# Patient Record
Sex: FEMALE | Race: WHITE | Employment: FULL TIME | URBAN - METROPOLITAN AREA
[De-identification: names, ages, dates, MRNs, and addresses within clinical notes are randomized per-mention and may not be internally consistent; named-entity substitution may affect disease eponyms.]

---

## 2017-10-22 ENCOUNTER — HOSPITAL ENCOUNTER (EMERGENCY)
Age: 52
Discharge: HOME | End: 2017-10-22
Attending: PHYSICIAN ASSISTANT

## 2017-10-22 DIAGNOSIS — T14.8XXA BITE BY ANIMAL: Primary | ICD-10-CM

## 2017-10-22 RX ORDER — AMOXICILLIN AND CLAVULANATE POTASSIUM 875; 125 MG/1; MG/1
1 TABLET, FILM COATED ORAL 2 TIMES DAILY
Qty: 20 TAB | Refills: 0 | Status: SHIPPED | OUTPATIENT
Start: 2017-10-22 | End: 2018-01-31

## 2017-10-22 RX ORDER — BACLOFEN 20 MG/1
20 TABLET ORAL 2 TIMES DAILY
Qty: 15 TAB | Refills: 0 | Status: SHIPPED | OUTPATIENT
Start: 2017-10-22 | End: 2018-01-31

## 2017-10-22 RX ORDER — IBUPROFEN 800 MG/1
800 TABLET ORAL
Qty: 20 TAB | Refills: 0 | Status: SHIPPED | OUTPATIENT
Start: 2017-10-22 | End: 2017-10-29

## 2017-10-22 RX ORDER — TRAMADOL HYDROCHLORIDE 50 MG/1
50 TABLET ORAL
Qty: 15 TAB | Refills: 0 | Status: SHIPPED | OUTPATIENT
Start: 2017-10-22 | End: 2017-10-31

## 2017-10-22 NOTE — DISCHARGE INSTRUCTIONS
Animal Bites: Care Instructions  Your Care Instructions  After an animal bite, the biggest concern is infection. The chance of infection depends on the type of animal that bit you, where on your body you were bitten, and your general health. Many animal bites are not closed with stitches, because this can increase the chance of infection. Your bite may take as little as 7 days or as long as several months to heal, depending on how bad it is. Taking good care of your wound at home will help it heal and reduce your chance of infection. The doctor has checked you carefully, but problems can develop later. If you notice any problems or new symptoms, get medical treatment right away. Follow-up care is a key part of your treatment and safety. Be sure to make and go to all appointments, and call your doctor if you are having problems. It's also a good idea to know your test results and keep a list of the medicines you take. How can you care for yourself at home? · If your doctor told you how to care for your wound, follow your doctor's instructions. If you did not get instructions, follow this general advice:  ¨ After 24 to 48 hours, gently wash the wound with clean water 2 times a day. Do not scrub or soak the wound. Don't use hydrogen peroxide or alcohol, which can slow healing. ¨ You may cover the wound with a thin layer of petroleum jelly, such as Vaseline, and a nonstick bandage. ¨ Apply more petroleum jelly and replace the bandage as needed. · After you shower, gently dry the wound with a clean towel. · If your doctor has closed the wound, cover the bandage with a plastic bag before you take a shower. · A small amount of skin redness and swelling around the wound edges and the stitches or staples is normal. Your wound may itch or feel irritated. Do not scratch or rub the wound.   · Ask your doctor if you can take an over-the-counter pain medicine, such as acetaminophen (Tylenol), ibuprofen (Advil, Motrin), or naproxen (Aleve). Read and follow all instructions on the label. · Do not take two or more pain medicines at the same time unless the doctor told you to. Many pain medicines have acetaminophen, which is Tylenol. Too much acetaminophen (Tylenol) can be harmful. · If your bite puts you at risk for rabies, you will get a series of shots over the next few weeks to prevent rabies. Your doctor will tell you when to get the shots. It is very important that you get the full cycle of shots. Follow your doctor's instructions exactly. · You may need a tetanus shot if you have not received one in the last 5 years. · If your doctor prescribed antibiotics, take them as directed. Do not stop taking them just because you feel better. You need to take the full course of antibiotics. When should you call for help? Call your doctor now or seek immediate medical care if:  · The skin near the bite turns cold or pale or it changes color. · You lose feeling in the area near the bite, or it feels numb or tingly. · You have trouble moving a limb near the bite. · You have symptoms of infection, such as:  ¨ Increased pain, swelling, warmth, or redness near the wound. ¨ Red streaks leading from the wound. ¨ Pus draining from the wound. ¨ A fever. · Blood soaks through the bandage. Oozing small amounts of blood is normal.  · Your pain is getting worse. Watch closely for changes in your health, and be sure to contact your doctor if you are not getting better as expected. Where can you learn more? Go to http://jason-francisco.info/. Enter F450 in the search box to learn more about \"Animal Bites: Care Instructions. \"  Current as of: March 20, 2017  Content Version: 11.3  © 9807-2123 [employer]. Care instructions adapted under license by [employer] (which disclaims liability or warranty for this information).  If you have questions about a medical condition or this instruction, always ask your healthcare professional. [place] [address] any warranty or liability for your use of this information. Bruises: Care Instructions  Your Care Instructions    Bruises occur when small blood vessels under the skin tear or rupture, most often from a twist, bump, or fall. Blood leaks into tissues under the skin and causes a black-and-blue spot that often turns colors, including purplish black, reddish blue, or yellowish green, as the bruise heals. Bruises hurt, but most are not serious and will go away on their own within 2 to 4 weeks. Sometimes, gravity causes them to spread down the body. A leg bruise usually will take longer to heal than a bruise on the face or arms. Follow-up care is a key part of your treatment and safety. Be sure to make and go to all appointments, and call your doctor if you are having problems. Its also a good idea to know your test results and keep a list of the medicines you take. How can you care for yourself at home? · Take pain medicines exactly as directed. ¨ If the doctor gave you a prescription medicine for pain, take it as prescribed. ¨ If you are not taking a prescription pain medicine, ask your doctor if you can take an over-the-counter medicine. · Put ice or a cold pack on the area for 10 to 20 minutes at a time. Put a thin cloth between the ice and your skin. · If you can, prop up the bruised area on pillows as much as possible for the next few days. Try to keep the bruise above the level of your heart. When should you call for help? Call your doctor now or seek immediate medical care if:  · You have signs of infection, such as:  ¨ Increased pain, swelling, warmth, or redness. ¨ Red streaks leading from the bruise. ¨ Pus draining from the bruise. ¨ A fever. · You have a bruise on your leg and signs of a blood clot, such as:  ¨ Increasing redness and swelling along with warmth, tenderness, and pain in the bruised area.   ¨ Pain in your calf, back of the knee, thigh, or groin. ¨ Redness and swelling in your leg or groin. · Your pain gets worse. Watch closely for changes in your health, and be sure to contact your doctor if:  · You do not get better as expected. Where can you learn more? Go to http://jason-francisco.info/. Enter [phone] [phone] in the search box to learn more about \"Bruises: Care Instructions. \"  Current as of: March 20, 2017  Content Version: 11.3  © 9493-6473 [email or web address]. Care instructions adapted under license by [employer] (which disclaims liability or warranty for this information). If you have questions about a medical condition or this instruction, always ask your healthcare professional. [address] any warranty or liability for your use of this information.

## 2017-10-22 NOTE — UC PROVIDER NOTE
Patient is a 50 y.o. female presenting with animal bite. The history is provided by the patient. Animal Bite    The incident occurred today. Incident location: animal shalter  Finger injury location: nilateral forearms and left trapezoid/ shoulder area. The pain is moderate. Associated symptoms include neck pain (and local swelling on affected area = soreness and abrasions). There have been no prior injuries to these areas. Her tetanus status is UTD. Past Medical History:   Diagnosis Date    Breast cyst 8/21/2014    Carpal tunnel syndrome of left wrist 8/21/2014    Fasciitis 12/21/2012    HTN (hypertension)     Migraine headache     Rosacea     Routine gynecological examination     Seizure disorder [facility])         History reviewed. No pertinent surgical history. Family History   Problem Relation Age of Onset    Family history unknown: Yes        Social History     Social History    Marital status:      Spouse name: N/A    Number of children: N/A    Years of education: N/A     Occupational History    Not on file. Social History Main Topics    Smoking status: Never Smoker    Smokeless tobacco: Not on file    Alcohol use No    Drug use: No    Sexual activity: Yes     Partners: Male     Other Topics Concern    Not on file     Social History Narrative                ALLERGIES: Review of patient's allergies indicates no known allergies. Review of Systems   Musculoskeletal: Positive for neck pain (and local swelling on affected area = soreness and abrasions). There were no vitals filed for this visit. Physical Exam   Constitutional: No distress. Musculoskeletal:        Left shoulder: She exhibits tenderness (on trapezoid area), swelling (with superficial bruise- skin intact ) and pain. She exhibits normal range of motion. Right forearm: She exhibits tenderness and swelling (with superficial abrasion- skin intact ).         Left forearm: She exhibits tenderness and swelling (with abrasions -  skin intact ). Arms:  Nursing note and vitals reviewed. MDM     Differential Diagnosis; Clinical Impression; Plan:     CLINICAL IMPRESSION:  Bite by animal  (primary encounter diagnosis)      DDX    Plan:    Ice- apply topical bacitracin   Advil/ baclofen- tramadol and prophylactic antibiotics  Amount and/or Complexity of Data Reviewed:    Review and summarize past medical records:  Yes  Risk of Significant Complications, Morbidity, and/or Mortality:   Presenting problems: Moderate  Management options:   Moderate  Progress:   Patient progress:  Stable      Procedures

## 2017-10-31 ENCOUNTER — OFFICE VISIT (OUTPATIENT)
Dept: INTERNAL MEDICINE CLINIC | Age: 52
End: 2017-10-31

## 2017-10-31 VITALS
WEIGHT: 196 LBS | HEART RATE: 76 BPM | HEIGHT: 63 IN | RESPIRATION RATE: 18 BRPM | TEMPERATURE: 98.2 F | BODY MASS INDEX: 34.73 KG/M2 | OXYGEN SATURATION: 96 % | DIASTOLIC BLOOD PRESSURE: 79 MMHG | SYSTOLIC BLOOD PRESSURE: 131 MMHG

## 2017-10-31 DIAGNOSIS — T07.XXXA MULTIPLE CONTUSIONS: Primary | ICD-10-CM

## 2017-10-31 DIAGNOSIS — G43.909 MIGRAINE WITHOUT STATUS MIGRAINOSUS, NOT INTRACTABLE, UNSPECIFIED MIGRAINE TYPE: ICD-10-CM

## 2017-10-31 DIAGNOSIS — T14.8XXA ANIMAL BITE: ICD-10-CM

## 2017-10-31 RX ORDER — IBUPROFEN 800 MG/1
800 TABLET ORAL
Qty: 40 TAB | Refills: 1 | Status: SHIPPED | OUTPATIENT
Start: 2017-10-31

## 2017-10-31 RX ORDER — SUMATRIPTAN 100 MG/1
TABLET, FILM COATED ORAL
Qty: 20 TAB | Refills: 3 | Status: SHIPPED | OUTPATIENT
Start: 2017-10-31 | End: 2018-02-06 | Stop reason: SDUPTHER

## 2017-10-31 RX ORDER — GABAPENTIN 100 MG/1
CAPSULE ORAL
Qty: 40 CAP | Refills: 1 | Status: SHIPPED | OUTPATIENT
Start: 2017-10-31 | End: 2018-01-31

## 2017-10-31 NOTE — PATIENT INSTRUCTIONS
1. Re-start the ibuprofen with food as needed for pain. 2.  You can take 650mg Tylenol/acetaminophen every 6 hours as needed for pain. Can take with Ibuprofen and gabapentin. Do not drink alcohol with Tylenol/acetaminophen. 3.  You can adjust the gabapentin for pain as reviewed. If not helping with 100mg capsule, can try a 300mg capsule instead--if not too sleepy with the lower dose. 4.  Can do local ultrasound imaging with the resolving bruises, if pain persists, or remains localized. If you prefer to see specialist/orthopedist to evaluate, or sports medicine, please let us know.

## 2017-10-31 NOTE — PROGRESS NOTES
History of Present Illness:   Constantine Hess is a 50 y.o. 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    Has multiple bites on left shoulder, right shoulder, and bilat forearms. She is not taking Tramadol. There was note about seizures on this and she has seizure disorder, and didn't take. She has pain and taking ibuprofen. She is more concerned about the pain--why present, and how to manage. She notes worse with contact giving her worse pain. Reviewed pain mgt options. Reviewed meds with pt. She is not planning to take Tramadol. Alternatives reviewed--plan gabapentin as below. SE's reviewed. Dosing reviewed. Worse location is bruise on right forearm. Past Medical History:   Diagnosis Date    Breast cyst 8/21/2014    Carpal tunnel syndrome of left wrist 8/21/2014    Fasciitis 12/21/2012    HTN (hypertension)     Migraine headache     Rosacea     Routine gynecological examination     Seizure disorder [facility])         Prior to Admission medications    Medication Sig Start Date End Date Taking? Authorizing Provider   baclofen (LIORESAL) 20 mg tablet Take 1 Tab by mouth two (2) times a day. 10/22/17  Yes Beau Whittaker MD   traMADol Kandy Poag) 50 mg tablet Take 1 Tab by mouth every six (6) hours as needed for Pain. Max Daily Amount: 200 mg. 10/22/17  Yes Beau Whittaker MD   amoxicillin-clavulanate (AUGMENTIN) 875-125 mg per tablet Take 1 Tab by mouth two (2) times a day.  10/22/17  Yes Beau Whittaker MD   lamoTRIgine (LAMICTAL) 25 mg tablet TAKE 2 TABLETS TWICE A DAY 7/16/16  Yes Nino Lane MD   SUMAtriptan (IMITREX) 100 mg tablet TAKE 1 TABLET BY MOUTH ONCE AS NEEDED MAY REPEAT 1 IN 2 HOURS AS NEEDED 5/3/16  Yes Scooter Harrison MD   doxycycline (MONODOX) 100 mg capsule Take 1 Cap by mouth two (2) times a day. 12/1/16   Cathie Travis NP   cyanocobalamin (VITAMIN B12) 500 mcg tablet Take 1 Tab by mouth daily. 1/12/16   Scooter Harrison MD   cholecalciferol (VITAMIN D3) 1,000 unit tablet Take 1 Tab by mouth daily. 1/12/16   Scooter Harrison MD        ROS    Vitals:    10/31/17 1335   BP: 131/79   Pulse: 76   Resp: 18   Temp: 98.2 °F (36.8 °C)   TempSrc: Oral   SpO2: 96%   Weight: 196 lb (88.9 kg)   Height: 5' 3\" (1.6 m)   PainSc:   0 - No pain   LMP: 10/21/2017        Physical Exam:     Physical Exam   Constitutional: She appears well-developed and well-nourished. No distress. HENT:   Head: Normocephalic and atraumatic. Eyes: Conjunctivae are normal. Right eye exhibits no discharge. Left eye exhibits no discharge. No scleral icterus. Neck: Normal range of motion. Neck supple. Healing abrasions, upper posterior shoulders--left > right. Cardiovascular: Normal rate, regular rhythm, normal heart sounds and intact distal pulses. Exam reveals no gallop and no friction rub. No murmur heard. Pulmonary/Chest: Effort normal and breath sounds normal. No respiratory distress. She has no wheezes. She has no rales. She exhibits no tenderness. Abdominal: Soft. Bowel sounds are normal. She exhibits no distension. There is no tenderness. Musculoskeletal: She exhibits no edema or tenderness. Arms:  Neurological: She is alert. She exhibits normal muscle tone. Coordination normal.   Skin: Skin is warm. No rash noted. She is not diaphoretic. No erythema. No pallor. Psychiatric: She has a normal mood and affect. Her behavior is normal. Judgment and thought content normal.       Assessment and Plan:       ICD-10-CM ICD-9-CM    1. Multiple contusions T07. XXXA 924.8 gabapentin (NEURONTIN) 100 mg capsule      ibuprofen (MOTRIN) 800 mg tablet   2. Animal bite T14. 8XXA 879.8 gabapentin (NEURONTIN) 100 mg capsule E906.5 ibuprofen (MOTRIN) 800 mg tablet   3. Migraine without status migrainosus, not intractable, unspecified migraine type G43.909 346.90 SUMAtriptan (IMITREX) 100 mg tablet       1,2:  Medications reviewed. Ibuprofen refill and dosing reviewed. Reviewed addition gabapentin--start nightly at 100mg to 200mg, then titrate based on SE's, improvement to 300mg TID if needed. Reviewed local US/imaging if pain persists/local worsening. 3.  Refill reviewed. Tramadol interaction reviewed, but she is not taking/planning to take Tramadol. Follow-up Disposition:  Return if symptoms worsen or fail to improve. reviewed diet, exercise and weight control  reviewed medications and side effects in detail  radiology results and schedule of future radiology studies reviewed with patient    For additional documentation of information and/or recommendations discussed this visit, please see notes in instructions. Plan and evaluation (above) reviewed with pt at visit  Patient voiced understanding of plan and provided with time to ask/review questions. After Visit Summary (AVS) provided to pt after visit with additional instructions as needed/reviewed.

## 2017-10-31 NOTE — PROGRESS NOTES
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 Have you been to the ER, urgent care clinic since your last visit? Hospitalized since your last visit? yes/ UC/ multiple animal bites /10/22/2017      Learning Assessment 12/29/2015   PRIMARY LEARNER Patient   HIGHEST LEVEL OF EDUCATION - PRIMARY LEARNER  4 YEARS OF COLLEGE   BARRIERS PRIMARY LEARNER NONE   CO-LEARNER CAREGIVER No   PRIMARY LANGUAGE ENGLISH   LEARNER PREFERENCE PRIMARY DEMONSTRATION   ANSWERED BY patient   RELATIONSHIP SELF     PHQ over the last two weeks 12/1/2016   Little interest or pleasure in doing things Not at all   Feeling down, depressed or hopeless Not at all   Total Score PHQ 2 0     Abuse Screening Questionnaire 10/31/2017   Do you ever feel afraid of your partner? N   Are you in a relationship with someone who physically or mentally threatens you? N   Is it safe for you to go home?  Y     Living medical will information given at previous visit

## 2017-10-31 NOTE — MR AVS SNAPSHOT
Visit Information Date & Time Provider Department Dept. Phone Encounter #  
 10/31/2017  1:30 PM Brie Farr, [address] [place] [address] and Internal Medicine [phone] [id number] Follow-up Instructions Return if symptoms worsen or fail to improve. Upcoming Health Maintenance Date Due  
 PAP AKA CERVICAL CYTOLOGY 5/27/1988 BREAST CANCER SCRN MAMMOGRAM 7/21/2013 FOBT Q 1 YEAR AGE 50-75 5/27/2017 INFLUENZA AGE 9 TO ADULT 8/1/2017 DTaP/Tdap/Td series (2 - Td) 1/4/2023 Allergies as of 10/31/2017  Review Complete On: 10/31/2017 By: Brie Farr MD  
 No Known Allergies Current Immunizations  Reviewed on 12/29/2015 Name Date Influenza Vaccine 10/15/2015 Tdap 1/4/2013 Not reviewed this visit You Were Diagnosed With   
  
 Codes Comments Multiple contusions    -  Primary ICD-10-CM: T07. Rj Sharma ICD-9-CM: 924.8 Animal bite     ICD-10-CM: T14. Waqar Dumont ICD-9-CM: 879.8, E906.5 Migraine without status migrainosus, not intractable, unspecified migraine type     ICD-10-CM: G43.909 ICD-9-CM: 346.90 Vitals BP Pulse Temp Resp Height(growth percentile) Weight(growth percentile) 131/79 (BP 1 Location: Left arm, BP Patient Position: Sitting) 76 98.2 °F (36.8 °C) (Oral) 18 5' 3\" (1.6 m) 196 lb (88.9 kg) LMP SpO2 BMI OB Status Smoking Status 10/21/2017 (Exact Date) 96% 34.72 kg/m2 Having regular periods Never Smoker Vitals History BMI and BSA Data Body Mass Index Body Surface Area 34.72 kg/m 2 1.99 m 2 Preferred Pharmacy Pharmacy Name Phone [facility] [place] [address] [phone] Your Updated Medication List  
  
   
This list is accurate as of: 10/31/17  2:23 PM.  Always use your most recent med list.  
  
  
  
  
 amoxicillin-clavulanate 875-125 mg per tablet Commonly known as:  AUGMENTIN Take 1 Tab by mouth two (2) times a day. baclofen 20 mg tablet Commonly known as:  LIORESAL Take 1 Tab by mouth two (2) times a day. cholecalciferol 1,000 unit tablet Commonly known as:  VITAMIN D3 Take 1 Tab by mouth daily. cyanocobalamin 500 mcg tablet Commonly known as:  VITAMIN B12 Take 1 Tab by mouth daily. gabapentin 100 mg capsule Commonly known as:  NEURONTIN Start with 100mg to 200mg nightly. May increase as reviewed to 100mg three times daily. ibuprofen 800 mg tablet Commonly known as:  MOTRIN Take 1 Tab by mouth every eight (8) hours as needed for Pain. Take with food to minimize stomach discomfort. lamoTRIgine 25 mg tablet Commonly known as: LaMICtal  
TAKE 2 TABLETS TWICE A DAY  
  
 SUMAtriptan 100 mg tablet Commonly known as:  IMITREX  
TAKE 1 TABLET BY MOUTH ONCE AS NEEDED MAY REPEAT 1 IN 2 HOURS AS NEEDED Prescriptions Sent to Pharmacy Refills SUMAtriptan (IMITREX) 100 mg tablet 3 Sig: TAKE 1 TABLET BY MOUTH ONCE AS NEEDED MAY REPEAT 1 IN 2 HOURS AS NEEDED Class: Normal  
 Pharmacy: [address] Ph #: [phone]  
 gabapentin (NEURONTIN) 100 mg capsule 1 Sig: Start with 100mg to 200mg nightly. May increase as reviewed to 100mg three times daily. Class: Normal  
 Pharmacy: [address] Ph #: [phone]  
 ibuprofen (MOTRIN) 800 mg tablet 1 Sig: Take 1 Tab by mouth every eight (8) hours as needed for Pain. Take with food to minimize stomach discomfort. Class: Normal  
 Pharmacy: [facility] [place] [address] Ph #: [phone] Route: Oral  
  
Follow-up Instructions Return if symptoms worsen or fail to improve. Patient Instructions 1. Re-start the ibuprofen with food as needed for pain. 2.  You can take 650mg Tylenol/acetaminophen every 6 hours as needed for pain. Can take with Ibuprofen and gabapentin. Do not drink alcohol with Tylenol/acetaminophen. 3.  You can adjust the gabapentin for pain as reviewed. If not helping with 100mg capsule, can try a 300mg capsule instead--if not too sleepy with the lower dose. 4.  Can do local ultrasound imaging with the resolving bruises, if pain persists, or remains localized. If you prefer to see specialist/orthopedist to evaluate, or sports medicine, please let us know. Introducing [facility]! Dear Alvin Gery: 
Thank you for requesting a Camiloo account. Our records indicate that you have previously registered for a Camiloo account but its currently inactive. Please call our Camiloo support line at [phone]. Additional Information If you have questions, please visit the Frequently Asked Questions section of the Camiloo website at [email or web address] Remember, Camiloo is NOT to be used for urgent needs. For medical emergencies, dial 911. Now available from your iPhone and Android! Please provide this summary of care documentation to your next provider. Your primary care clinician is listed as [address] If you have any questions after today's visit, please call [phone].

## 2018-01-31 ENCOUNTER — HOSPITAL ENCOUNTER (OUTPATIENT)
Age: 53
Setting detail: OBSERVATION
Discharge: HOME | End: 2018-02-01
Attending: EMERGENCY MEDICINE | Admitting: INTERNAL MEDICINE
Payer: COMMERCIAL

## 2018-01-31 ENCOUNTER — APPOINTMENT (OUTPATIENT)
Dept: MRI IMAGING | Age: 53
End: 2018-01-31
Attending: INTERNAL MEDICINE
Payer: COMMERCIAL

## 2018-01-31 ENCOUNTER — APPOINTMENT (OUTPATIENT)
Dept: CT IMAGING | Age: 53
End: 2018-01-31
Attending: EMERGENCY MEDICINE
Payer: COMMERCIAL

## 2018-01-31 ENCOUNTER — APPOINTMENT (OUTPATIENT)
Dept: GENERAL RADIOLOGY | Age: 53
End: 2018-01-31
Attending: INTERNAL MEDICINE
Payer: COMMERCIAL

## 2018-01-31 DIAGNOSIS — G45.4 TGA (TRANSIENT GLOBAL AMNESIA): ICD-10-CM

## 2018-01-31 DIAGNOSIS — G43.009 MIGRAINE WITHOUT AURA AND WITHOUT STATUS MIGRAINOSUS, NOT INTRACTABLE: ICD-10-CM

## 2018-01-31 DIAGNOSIS — R41.0 CONFUSION: Primary | ICD-10-CM

## 2018-01-31 DIAGNOSIS — G93.40 ACUTE ENCEPHALOPATHY: ICD-10-CM

## 2018-01-31 DIAGNOSIS — R41.3 AMNESIA: ICD-10-CM

## 2018-01-31 PROBLEM — E66.9 OBESITY (BMI 30-39.9): Chronic | Status: ACTIVE | Noted: 2018-01-31

## 2018-01-31 PROBLEM — R41.2 RETROGRADE AMNESIA: Status: ACTIVE | Noted: 2018-01-31

## 2018-01-31 LAB
ALBUMIN SERPL-MCNC: 3.5 G/DL (ref 3.5–5)
ALBUMIN/GLOB SERPL: 0.9 {RATIO} (ref 1.1–2.2)
ALP SERPL-CCNC: 83 U/L (ref 45–117)
ALT SERPL-CCNC: 15 U/L (ref 12–78)
AMMONIA PLAS-SCNC: 17 UMOL/L
AMPHET UR QL SCN: NEGATIVE
ANION GAP SERPL CALC-SCNC: 4 MMOL/L (ref 5–15)
APPEARANCE UR: CLEAR
AST SERPL-CCNC: 9 U/L (ref 15–37)
BACTERIA URNS QL MICRO: NEGATIVE /HPF
BARBITURATES UR QL SCN: NEGATIVE
BASOPHILS # BLD: 0 K/UL (ref 0–0.1)
BASOPHILS NFR BLD: 0 % (ref 0–1)
BENZODIAZ UR QL: NEGATIVE
BILIRUB SERPL-MCNC: 0.2 MG/DL (ref 0.2–1)
BILIRUB UR QL: NEGATIVE
BUN SERPL-MCNC: 10 MG/DL (ref 6–20)
BUN/CREAT SERPL: 10 (ref 12–20)
CALCIUM SERPL-MCNC: 8.7 MG/DL (ref 8.5–10.1)
CANNABINOIDS UR QL SCN: NEGATIVE
CHLORIDE SERPL-SCNC: 106 MMOL/L (ref 97–108)
CHOLEST SERPL-MCNC: 191 MG/DL
CK SERPL-CCNC: 51 U/L (ref 26–192)
CO2 SERPL-SCNC: 26 MMOL/L (ref 21–32)
COCAINE UR QL SCN: NEGATIVE
COLOR UR: NORMAL
CREAT SERPL-MCNC: 0.99 MG/DL (ref 0.55–1.02)
DIFFERENTIAL METHOD BLD: ABNORMAL
DRUG SCRN COMMENT,DRGCM: NORMAL
EOSINOPHIL # BLD: 0.1 K/UL (ref 0–0.4)
EOSINOPHIL NFR BLD: 1 % (ref 0–7)
EPITH CASTS URNS QL MICRO: NORMAL /LPF
ERYTHROCYTE [DISTWIDTH] IN BLOOD BY AUTOMATED COUNT: 12.7 % (ref 11.5–14.5)
FOLATE SERPL-MCNC: 17.4 NG/ML (ref 5–21)
GLOBULIN SER CALC-MCNC: 4.1 G/DL (ref 2–4)
GLUCOSE SERPL-MCNC: 105 MG/DL (ref 65–100)
GLUCOSE UR STRIP.AUTO-MCNC: NEGATIVE MG/DL
HCG UR QL: NEGATIVE
HCT VFR BLD AUTO: 40.1 % (ref 35–47)
HDLC SERPL-MCNC: 54 MG/DL
HDLC SERPL: 3.5 {RATIO} (ref 0–5)
HGB BLD-MCNC: 13.2 G/DL (ref 11.5–16)
HGB UR QL STRIP: NEGATIVE
HYALINE CASTS URNS QL MICRO: NORMAL /LPF (ref 0–5)
IMM GRANULOCYTES # BLD: 0 K/UL (ref 0–0.04)
IMM GRANULOCYTES NFR BLD AUTO: 0 % (ref 0–0.5)
KETONES UR QL STRIP.AUTO: NEGATIVE MG/DL
LDLC SERPL CALC-MCNC: 118 MG/DL (ref 0–100)
LEUKOCYTE ESTERASE UR QL STRIP.AUTO: NEGATIVE
LIPID PROFILE,FLP: ABNORMAL
LYMPHOCYTES # BLD: 1.3 K/UL (ref 0.8–3.5)
LYMPHOCYTES NFR BLD: 16 % (ref 12–49)
MCH RBC QN AUTO: 29.2 PG (ref 26–34)
MCHC RBC AUTO-ENTMCNC: 32.9 G/DL (ref 30–36.5)
MCV RBC AUTO: 88.7 FL (ref 80–99)
METHADONE UR QL: NEGATIVE
MONOCYTES # BLD: 0.3 K/UL (ref 0–1)
MONOCYTES NFR BLD: 4 % (ref 5–13)
NEUTS SEG # BLD: 6.2 K/UL (ref 1.8–8)
NEUTS SEG NFR BLD: 78 % (ref 32–75)
NITRITE UR QL STRIP.AUTO: NEGATIVE
NRBC # BLD: 0 K/UL (ref 0–0.01)
NRBC BLD-RTO: 0 PER 100 WBC
OPIATES UR QL: NEGATIVE
PCP UR QL: NEGATIVE
PH UR STRIP: 7.5 [PH] (ref 5–8)
PLATELET # BLD AUTO: 409 K/UL (ref 150–400)
PMV BLD AUTO: 9.7 FL (ref 8.9–12.9)
POTASSIUM SERPL-SCNC: 4.2 MMOL/L (ref 3.5–5.1)
PROT SERPL-MCNC: 7.6 G/DL (ref 6.4–8.2)
PROT UR STRIP-MCNC: NEGATIVE MG/DL
RBC # BLD AUTO: 4.52 M/UL (ref 3.8–5.2)
RBC #/AREA URNS HPF: NORMAL /HPF (ref 0–5)
SODIUM SERPL-SCNC: 136 MMOL/L (ref 136–145)
SP GR UR REFRACTOMETRY: 1 (ref 1–1.03)
TRIGL SERPL-MCNC: 95 MG/DL (ref ?–150)
TSH SERPL DL<=0.05 MIU/L-ACNC: 1.59 UIU/ML (ref 0.36–3.74)
UR CULT HOLD, URHOLD: NORMAL
UROBILINOGEN UR QL STRIP.AUTO: 0.2 EU/DL (ref 0.2–1)
VIT B12 SERPL-MCNC: 338 PG/ML (ref 211–911)
VLDLC SERPL CALC-MCNC: 19 MG/DL
WBC # BLD AUTO: 8 K/UL (ref 3.6–11)
WBC URNS QL MICRO: NORMAL /HPF (ref 0–4)

## 2018-01-31 PROCEDURE — 81025 URINE PREGNANCY TEST: CPT

## 2018-01-31 PROCEDURE — 95816 EEG AWAKE AND DROWSY: CPT | Performed by: INTERNAL MEDICINE

## 2018-01-31 PROCEDURE — 82607 VITAMIN B-12: CPT | Performed by: INTERNAL MEDICINE

## 2018-01-31 PROCEDURE — 75810000133 HC LUMBAR PUNCTURE

## 2018-01-31 PROCEDURE — 99218 HC RM OBSERVATION: CPT

## 2018-01-31 PROCEDURE — 80053 COMPREHEN METABOLIC PANEL: CPT | Performed by: EMERGENCY MEDICINE

## 2018-01-31 PROCEDURE — 85025 COMPLETE CBC W/AUTO DIFF WBC: CPT | Performed by: EMERGENCY MEDICINE

## 2018-01-31 PROCEDURE — 80175 DRUG SCREEN QUAN LAMOTRIGINE: CPT | Performed by: INTERNAL MEDICINE

## 2018-01-31 PROCEDURE — 82140 ASSAY OF AMMONIA: CPT | Performed by: INTERNAL MEDICINE

## 2018-01-31 PROCEDURE — 80307 DRUG TEST PRSMV CHEM ANLYZR: CPT | Performed by: EMERGENCY MEDICINE

## 2018-01-31 PROCEDURE — 74011250636 HC RX REV CODE- 250/636: Performed by: PSYCHIATRY & NEUROLOGY

## 2018-01-31 PROCEDURE — 96365 THER/PROPH/DIAG IV INF INIT: CPT

## 2018-01-31 PROCEDURE — 74011250636 HC RX REV CODE- 250/636: Performed by: INTERNAL MEDICINE

## 2018-01-31 PROCEDURE — 94760 N-INVAS EAR/PLS OXIMETRY 1: CPT

## 2018-01-31 PROCEDURE — 77030014143 HC TY PUNC LUMBR BD -A

## 2018-01-31 PROCEDURE — 36415 COLL VENOUS BLD VENIPUNCTURE: CPT | Performed by: EMERGENCY MEDICINE

## 2018-01-31 PROCEDURE — 81001 URINALYSIS AUTO W/SCOPE: CPT | Performed by: EMERGENCY MEDICINE

## 2018-01-31 PROCEDURE — 82746 ASSAY OF FOLIC ACID SERUM: CPT | Performed by: INTERNAL MEDICINE

## 2018-01-31 PROCEDURE — 82550 ASSAY OF CK (CPK): CPT | Performed by: EMERGENCY MEDICINE

## 2018-01-31 PROCEDURE — 77030003666 HC NDL SPINAL BD -A

## 2018-01-31 PROCEDURE — 74011250636 HC RX REV CODE- 250/636: Performed by: EMERGENCY MEDICINE

## 2018-01-31 PROCEDURE — 96366 THER/PROPH/DIAG IV INF ADDON: CPT

## 2018-01-31 PROCEDURE — 99285 EMERGENCY DEPT VISIT HI MDM: CPT

## 2018-01-31 PROCEDURE — 70553 MRI BRAIN STEM W/O & W/DYE: CPT

## 2018-01-31 PROCEDURE — 70450 CT HEAD/BRAIN W/O DYE: CPT

## 2018-01-31 PROCEDURE — 71046 X-RAY EXAM CHEST 2 VIEWS: CPT

## 2018-01-31 PROCEDURE — 80061 LIPID PANEL: CPT | Performed by: INTERNAL MEDICINE

## 2018-01-31 PROCEDURE — 96372 THER/PROPH/DIAG INJ SC/IM: CPT

## 2018-01-31 PROCEDURE — A9576 INJ PROHANCE MULTIPACK: HCPCS | Performed by: EMERGENCY MEDICINE

## 2018-01-31 PROCEDURE — 74011250637 HC RX REV CODE- 250/637: Performed by: INTERNAL MEDICINE

## 2018-01-31 PROCEDURE — 84443 ASSAY THYROID STIM HORMONE: CPT | Performed by: INTERNAL MEDICINE

## 2018-01-31 PROCEDURE — 74011250637 HC RX REV CODE- 250/637: Performed by: EMERGENCY MEDICINE

## 2018-01-31 RX ORDER — LORAZEPAM 1 MG/1
1 TABLET ORAL
Status: COMPLETED | OUTPATIENT
Start: 2018-01-31 | End: 2018-01-31

## 2018-01-31 RX ORDER — ONDANSETRON 2 MG/ML
4 INJECTION INTRAMUSCULAR; INTRAVENOUS
Status: DISCONTINUED | OUTPATIENT
Start: 2018-01-31 | End: 2018-02-01 | Stop reason: HOSPADM

## 2018-01-31 RX ORDER — ENOXAPARIN SODIUM 100 MG/ML
40 INJECTION SUBCUTANEOUS EVERY 24 HOURS
Status: DISCONTINUED | OUTPATIENT
Start: 2018-01-31 | End: 2018-02-01 | Stop reason: HOSPADM

## 2018-01-31 RX ORDER — ACETAMINOPHEN 325 MG/1
650 TABLET ORAL
Status: DISCONTINUED | OUTPATIENT
Start: 2018-01-31 | End: 2018-02-01 | Stop reason: HOSPADM

## 2018-01-31 RX ORDER — SODIUM CHLORIDE 0.9 % (FLUSH) 0.9 %
5-10 SYRINGE (ML) INJECTION AS NEEDED
Status: DISCONTINUED | OUTPATIENT
Start: 2018-01-31 | End: 2018-02-01 | Stop reason: HOSPADM

## 2018-01-31 RX ORDER — SODIUM CHLORIDE 0.9 % (FLUSH) 0.9 %
10 SYRINGE (ML) INJECTION
Status: COMPLETED | OUTPATIENT
Start: 2018-01-31 | End: 2018-01-31

## 2018-01-31 RX ORDER — MAGNESIUM SULFATE HEPTAHYDRATE 40 MG/ML
2 INJECTION, SOLUTION INTRAVENOUS ONCE
Status: COMPLETED | OUTPATIENT
Start: 2018-01-31 | End: 2018-01-31

## 2018-01-31 RX ORDER — ASPIRIN 81 MG/1
81 TABLET ORAL DAILY
Status: DISCONTINUED | OUTPATIENT
Start: 2018-02-01 | End: 2018-02-01 | Stop reason: HOSPADM

## 2018-01-31 RX ORDER — SODIUM CHLORIDE 0.9 % (FLUSH) 0.9 %
5-10 SYRINGE (ML) INJECTION EVERY 8 HOURS
Status: DISCONTINUED | OUTPATIENT
Start: 2018-01-31 | End: 2018-02-01 | Stop reason: HOSPADM

## 2018-01-31 RX ORDER — ACETAMINOPHEN 500 MG
1000 TABLET ORAL ONCE
Status: COMPLETED | OUTPATIENT
Start: 2018-01-31 | End: 2018-01-31

## 2018-01-31 RX ADMIN — ACETAMINOPHEN 650 MG: 325 TABLET, FILM COATED ORAL at 18:28

## 2018-01-31 RX ADMIN — Medication 10 ML: at 15:36

## 2018-01-31 RX ADMIN — Medication 10 ML: at 21:45

## 2018-01-31 RX ADMIN — ENOXAPARIN SODIUM 40 MG: 40 INJECTION SUBCUTANEOUS at 13:18

## 2018-01-31 RX ADMIN — GADOTERIDOL 18 ML: 279.3 INJECTION, SOLUTION INTRAVENOUS at 15:36

## 2018-01-31 RX ADMIN — ACETAMINOPHEN 650 MG: 325 TABLET, FILM COATED ORAL at 13:18

## 2018-01-31 RX ADMIN — Medication 10 ML: at 16:00

## 2018-01-31 RX ADMIN — ACETAMINOPHEN 1000 MG: 500 TABLET ORAL at 07:57

## 2018-01-31 RX ADMIN — LORAZEPAM 1 MG: 1 TABLET ORAL at 07:57

## 2018-01-31 RX ADMIN — MAGNESIUM SULFATE HEPTAHYDRATE 2 G: 40 INJECTION, SOLUTION INTRAVENOUS at 21:15

## 2018-01-31 NOTE — ROUTINE PROCESS
TRANSFER - OUT REPORT:    Verbal report given to Danyel Gardner RN(name) on Reagan Ruby  being transferred to [facility](unit) for routine progression of care       Report consisted of patients Situation, Background, Assessment and   Recommendations(SBAR). Information from the following report(s) SBAR and Kardex was reviewed with the receiving nurse. Lines:   Peripheral IV 01/31/18 Left Antecubital (Active)   Site Assessment Clean, dry, & intact 1/31/2018  7:30 AM   Phlebitis Assessment 0 1/31/2018  7:30 AM   Infiltration Assessment 0 1/31/2018  7:30 AM   Dressing Status Clean, dry, & intact 1/31/2018  7:30 AM   Hub Color/Line Status Pink 1/31/2018  7:30 AM        Opportunity for questions and clarification was provided.       Patient transported with:   [employer]

## 2018-01-31 NOTE — PROGRESS NOTES
Admission Medication Reconciliation:    Information obtained from: Patient, family member    Significant PMH/Disease States:   Past Medical History:   Diagnosis Date    Breast cyst 8/21/2014    Carpal tunnel syndrome of left wrist 8/21/2014    Fasciitis 12/21/2012    HTN (hypertension)     Migraine headache     Rosacea     Routine gynecological examination     Seizure disorder [facility])        Chief Complaint for this Admission:  AMS    Allergies:  Review of patient's allergies indicates no known allergies. Prior to Admission Medications:   Prior to Admission Medications   Prescriptions Last Dose Informant Patient Reported? Taking? SUMAtriptan (IMITREX) 100 mg tablet   No yes   Sig: TAKE 1 TABLET BY MOUTH ONCE AS NEEDED MAY REPEAT 1 IN 2 HOURS AS NEEDED   ibuprofen (MOTRIN) 800 mg tablet   No yes   Sig: Take 1 Tab by mouth every eight (8) hours as needed for Pain. Take with food to minimize stomach discomfort. Facility-Administered Medications: None         Comments/Recommendations:   1. Removed Augmentin, baclofen, vitamin D3, B12, gabapentin  2. Removed Lamictal - Pt reports she hasn't taken in a few years but is supposed to be taking.

## 2018-01-31 NOTE — ED NOTES
CONSULT NOTE:  9:16 AM Sylwia Griggs MD spoke with Dr. Delfina Montenegro, Consult for Hospitalist.  Discussed available diagnostic tests and clinical findings. She is in agreement with care plans as outlined. Dr. Delfina Montenegro will evaluate and admit the patient.

## 2018-01-31 NOTE — H&P
History & Physical  Cornelia Nazario MD, [facility]    Date of admission: 1/31/2018    Patient name: Maria R Calvin  MRN: [id number]  YOB: 1965  Age: 50 y.o. Primary care provider:  Ang Raymond MD     Source of Information: patient, medical records and  at bedside                              Chief complaint: \"I can't remember. \"    History of present illness  Maria R Calvin is a 50 y.o. female with seizure disorder, obesity, HTN, and migraine HAs who presents to the ED from home with confusion and inability to remember events for the last few days. Patient and  report that pt woke up confused this morning. She states she cannot remember events from the last 4-5 days. She reports last seizure was 10-12 years ago and has not taken Lamictal in a few years (unbeknownst to her ). She has not seen a neurologist in many years. She denies any recent changes in meds, travel, exposures, changes in diet or activity and cannot recall any inciting factor for this memory loss or sudden onset confusion.  reports that she had confusion one time after her postpartum seizure. She c/o headache but not like her usual migraines, lightheadedness, and some dizziness. She denies f/c/n/v, CP, SOB, cough, abdominal pain, dysuria, diarrhea/constipation, weight changes, numbness, tingling, weakness, LOC, audiovisual deficits, falls/traumas. Of note, patient reported being attacked by a donkey November 2017 with bites, scratches, abrasions and completing a course of antibiotics. ED triage VS were temp 98F, HR 84, /89, RR 24, SpO2 95% on RA. In the ED, she received APAP 1g po x1 and lorazepam 1mg po x1. ED MD attempted an LP at bedside but was unsuccessful.      Past Medical History:   Diagnosis Date    Breast cyst 8/21/2014    Carpal tunnel syndrome of left wrist 8/21/2014    Fasciitis 12/21/2012    HTN (hypertension)     Migraine headache     Rosacea     Routine gynecological examination     Seizure disorder [facility])       Surgical history -  x1    Prior to Admission medications    Medication Sig Start Date End Date Taking? Authorizing Provider   SUMAtriptan (IMITREX) 100 mg tablet TAKE 1 TABLET BY MOUTH ONCE AS NEEDED MAY REPEAT 1 IN 2 HOURS AS NEEDED 10/31/17   Sonu Booth MD   ibuprofen (MOTRIN) 800 mg tablet Take 1 Tab by mouth every eight (8) hours as needed for Pain. Take with food to minimize stomach discomfort. 10/31/17   Sonu Booth MD     No Known Allergies     Family History   Problem Relation Age of Onset    Family history unknown: Yes   Father  of emphysema at age 50. Mother is still living and has migraine HAs and memory loss. Social history  Patient resides  x  Independently      With family care      Assisted living      SNF    Ambulates  x  Independently      With cane       Assisted walker         Alcohol history     None   x  Social: very rare and cannot remember the last time she had a drink     Chronic   Smoking history  x  None     Former smoker     Current smoker     Denies illicit drug use. 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 History   Smoking Status    Never Smoker   Smokeless Tobacco    Not on file       Code status  x  Full code     DNR/DNI        Code status Full by default. Review of systems  A comprehensive review of systems was negative except for that written in the History of Present Illness.      Physical Examination   Visit Vitals    /62    Pulse 70    Temp 98.6 °F (37 °C)    Resp 12    Ht 5' 3\" (1.6 m)    Wt 90.7 kg (200 lb)    SpO2 93%    BMI 35.43 kg/m2          O2 Device: Room air    Gen: awake, NAD, cooperative, pleasant, obese  Head: NCAT  EENT: PERRL, EOMI, MMM, oropharynx benign  Neck: supple, no masses  Lungs: CTAB, no w/r/r  CVS: regular rhythm, normal rate, S1S2, no m/r/g appreciated, no peripheral edema, +pulses  Abd: +BS, soft, NT, ND, obese  MSK: moves all extremities  Neuro: AOx3, CN II-XII grossly intact, NFD, responds appropriately to questions and commands  Skin: warm, dry; no rashes, lesions, ulcers noted    Data Review    EKG: none    24 Hour Results:  Recent Results (from the past 24 hour(s))   CBC WITH AUTOMATED DIFF    Collection Time: 01/31/18  6:26 AM   Result Value Ref Range    WBC 8.0 3.6 - 11.0 K/uL    RBC 4.52 3.80 - 5.20 M/uL    HGB 13.2 11.5 - 16.0 g/dL    HCT 40.1 35.0 - 47.0 %    MCV 88.7 80.0 - 99.0 FL    MCH 29.2 26.0 - 34.0 PG    MCHC 32.9 30.0 - 36.5 g/dL    RDW 12.7 11.5 - 14.5 %    PLATELET 621 (H) 677 - 400 K/uL    MPV 9.7 8.9 - 12.9 FL    NRBC 0.0 0  WBC    ABSOLUTE NRBC 0.00 0.00 - 0.01 K/uL    NEUTROPHILS 78 (H) 32 - 75 %    LYMPHOCYTES 16 12 - 49 %    MONOCYTES 4 (L) 5 - 13 %    EOSINOPHILS 1 0 - 7 %    BASOPHILS 0 0 - 1 %    IMMATURE GRANULOCYTES 0 0.0 - 0.5 %    ABS. NEUTROPHILS 6.2 1.8 - 8.0 K/UL    ABS. LYMPHOCYTES 1.3 0.8 - 3.5 K/UL    ABS. MONOCYTES 0.3 0.0 - 1.0 K/UL    ABS. EOSINOPHILS 0.1 0.0 - 0.4 K/UL    ABS. BASOPHILS 0.0 0.0 - 0.1 K/UL    ABS. IMM. GRANS. 0.0 0.00 - 0.04 K/UL    DF AUTOMATED     METABOLIC PANEL, COMPREHENSIVE    Collection Time: 01/31/18  6:26 AM   Result Value Ref Range    Sodium 136 136 - 145 mmol/L    Potassium 4.2 3.5 - 5.1 mmol/L    Chloride 106 97 - 108 mmol/L    CO2 26 21 - 32 mmol/L    Anion gap 4 (L) 5 - 15 mmol/L    Glucose 105 (H) 65 - 100 mg/dL    BUN 10 6 - 20 MG/DL    Creatinine 0.99 0.55 - 1.02 MG/DL    BUN/Creatinine ratio 10 (L) 12 - 20      GFR est AA >60 >60 ml/min/1.73m2    GFR est non-AA 59 (L) >60 ml/min/1.73m2    Calcium 8.7 8.5 - 10.1 MG/DL    Bilirubin, total 0.2 0.2 - 1.0 MG/DL    ALT (SGPT) 15 12 - 78 U/L    AST (SGOT) 9 (L) 15 - 37 U/L    Alk.  phosphatase 83 45 - 117 U/L    Protein, total 7.6 6.4 - 8.2 g/dL    Albumin 3.5 3.5 - 5.0 g/dL    Globulin 4.1 (H) 2.0 - 4.0 g/dL    A-G Ratio 0.9 (L) 1.1 - 2.2 CK    Collection Time: 01/31/18  6:26 AM   Result Value Ref Range    CK 51 26 - 192 U/L   URINALYSIS W/MICROSCOPIC    Collection Time: 01/31/18  7:20 AM   Result Value Ref Range    Color YELLOW/STRAW      Appearance CLEAR CLEAR      Specific gravity 1.005 1.003 - 1.030      pH (UA) 7.5 5.0 - 8.0      Protein NEGATIVE  NEG mg/dL    Glucose NEGATIVE  NEG mg/dL    Ketone NEGATIVE  NEG mg/dL    Bilirubin NEGATIVE  NEG      Blood NEGATIVE  NEG      Urobilinogen 0.2 0.2 - 1.0 EU/dL    Nitrites NEGATIVE  NEG      Leukocyte Esterase NEGATIVE  NEG      WBC 0-4 0 - 4 /hpf    RBC 0-5 0 - 5 /hpf    Epithelial cells FEW FEW /lpf    Bacteria NEGATIVE  NEG /hpf    Hyaline cast 0-2 0 - 5 /lpf   URINE CULTURE HOLD SAMPLE    Collection Time: 01/31/18  7:20 AM   Result Value Ref Range    Urine culture hold URINE ON HOLD IN MICROBIOLOGY DEPT FOR 3 DAYS     DRUG SCREEN, URINE    Collection Time: 01/31/18  7:20 AM   Result Value Ref Range    AMPHETAMINES NEGATIVE  NEG      BARBITURATES NEGATIVE  NEG      BENZODIAZEPINES NEGATIVE  NEG      COCAINE NEGATIVE  NEG      METHADONE NEGATIVE  NEG      OPIATES NEGATIVE  NEG      PCP(PHENCYCLIDINE) NEGATIVE  NEG      THC (TH-CANNABINOL) NEGATIVE  NEG      Drug screen comment (NOTE)      Recent Labs      01/31/18   0626   WBC  8.0   HGB  13.2   HCT  40.1   PLT  409*     Recent Labs      01/31/18   0626   NA  136   K  4.2   CL  106   CO2  26   GLU  105*   BUN  10   CREA  0.99   CA  8.7   ALB  3.5   SGOT  9*   ALT  15       Imaging  CT Results  (Last 48 hours)               01/31/18 0647  CT HEAD WO CONT Final result    Impression:  IMPRESSION: No acute intracranial findings. Nonspecific soft tissue mass left   frontal scalp. Narrative:  EXAM:  CT HEAD WO CONT       INDICATION:   confusion, headache, history of seizures       COMPARISON: None. CONTRAST:  None. TECHNIQUE: Unenhanced CT of the head was performed using 5 mm images. Brain and   bone windows were generated.   CT dose reduction was achieved through use of a   standardized protocol tailored for this examination and automatic exposure   control for dose modulation. FINDINGS:   The ventricles and sulci are normal in size, shape and configuration and   midline. There is no significant white matter disease. There is no intracranial   hemorrhage, extra-axial collection, mass, mass effect or midline shift. The   basilar cisterns are open. No acute infarct is identified. The bone windows   demonstrate no abnormalities. The visualized portions of the paranasal sinuses   and mastoid air cells are clear. There is a nonspecific soft tissue mass in the   left frontal scalp, likely benign. Assessment and Plan   Principal Problem:    Retrograde amnesia (POA)  - pt seen/examined in Ed on 1/31/18; place in OBS NSTU  - CT head unremarkable  - check MRI brain, TSH, folate, B12  - consult Neuro    Active Problems:    Seizure disorder  - last reported seizure 10-12 years ago and has not taken Lamictal in many years  - check EEG and lamotrigine level  - seizure precautions  - Neuro consult      HTN (hypertension) - BP elevated on admission but now controlled; no antihypertensives on PTA med list      Acute encephalopathy (POA) - seems resolved to baseline but now just with persistent retrograde amnesia    Obesity, Body mass index is 35.43 kg/(m^2).      Diet: regular  Activity: ambulate with assistance  DVT prophylaxis: enoxaparin  Isolation precautions: none  Consultations: Neurology  Anticipated disposition: Likely home tomorrow       Signed by: Rocky Calixto MD    January 31, 2018 at 10:27 AM

## 2018-01-31 NOTE — CONSULTS
NEUROLOGY  1/31/2018     Consulted by: Jo Steele MD        Patient ID:  Luis Sierra  [id number]  50 y.o.  1965    Chief Complaint   Patient presents with    Altered mental status       HPI    Mrs. 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 She is here with her . The patient tells me her last clear memory was going to lunch on Saturday which was about 5 days ago. Her next clear memory is today. Her  says she was acting normally all the previous days. No unusual numbness or weakness. No facial changes. No speech changes. She woke up this morning confused. She has some fragments of memory coming back but not intact. No prior episodes. She has been complaining of a headache. She has chronic migraine headaches occurring a few times a week. She has a remote history of epilepsy during pregnancy and has not had a seizure in over 10 years. She stopped taking lamotrigine a few years ago. She has hypertension. No other acute changes. She had a job interview yesterday which she has no recall of. Review of Systems   Eyes: Positive for photophobia. Gastrointestinal: Positive for nausea. Neurological: Positive for headaches. Psychiatric/Behavioral: Positive for memory loss. All other systems reviewed and are negative. Past Medical History:   Diagnosis Date    Breast cyst 8/21/2014    Carpal tunnel syndrome of left wrist 8/21/2014    Fasciitis 12/21/2012    HTN (hypertension)     Migraine headache     Rosacea     Routine gynecological examination     Seizure disorder ([facility] [address]      Family History   Problem Relation Age of Onset    Family history unknown: Yes     Social History     Social History    Marital status:      Spouse name: N/A    Number of children: N/A    Years of education: N/A     Occupational History    Not on file.      Social History Main Topics    Smoking status: Never Smoker    Smokeless tobacco: Not on file    Alcohol use No    Drug use: No    Sexual activity: Yes     Partners: Male     Other Topics Concern    Not on file     Social History Narrative     Current Facility-Administered Medications   Medication Dose Route Frequency    sodium chloride (NS) flush 5-10 mL  5-10 mL IntraVENous Q8H    sodium chloride (NS) flush 5-10 mL  5-10 mL IntraVENous PRN    acetaminophen (TYLENOL) tablet 650 mg  650 mg Oral Q4H PRN    ondansetron (ZOFRAN) injection 4 mg  4 mg IntraVENous Q4H PRN    enoxaparin (LOVENOX) injection 40 mg  40 mg SubCUTAneous Q24H     No Known Allergies    Visit Vitals    /85 (BP 1 Location: Right arm, BP Patient Position: At rest;Head of bed elevated (Comment degrees))    Pulse 80    Temp 97.9 °F (36.6 °C)    Resp 20    Ht 5' 3\" (1.6 m)    Wt 90.7 kg (200 lb)    SpO2 96%    Breastfeeding No    BMI 35.43 kg/m2     Physical Exam   Constitutional: She is oriented to person, place, and time. She appears well-developed and well-nourished. Cardiovascular: Normal rate. Pulmonary/Chest: Effort normal.   Neurological: She is oriented to person, place, and time. She has normal strength. Gait normal.   Skin: Skin is warm and dry. Psychiatric: She has a normal mood and affect. Her behavior is normal.   Vitals reviewed. Neurologic Exam     Mental Status   Oriented to person, place, and time. Cranial Nerves   Cranial nerves II through XII intact. Motor Exam   Muscle bulk: normal    Strength   Strength 5/5 throughout.      Sensory Exam   Light touch normal.     Gait, Coordination, and Reflexes     Gait  Gait: normal    Tremor   Resting tremor: absent           Lab Results  Component Value Date/Time   WBC 8.0 01/31/2018 06:26 AM   HGB 13.2 01/31/2018 06:26 AM   HCT 40.1 01/31/2018 06:26 AM   PLATELET 774 96/64/6506 06:26 AM   MCV 88.7 01/31/2018 06:26 AM     Lab Results  Component Value Date/Time   Glucose 105 01/31/2018 06:26 AM   LDL, calculated 118 01/31/2018 06:26 AM Creatinine 0.99 01/31/2018 06:26 AM      Lab Results  Component Value Date/Time   Cholesterol, total 191 01/31/2018 06:26 AM   HDL Cholesterol 54 01/31/2018 06:26 AM   LDL, calculated 118 01/31/2018 06:26 AM   Triglyceride 95 01/31/2018 06:26 AM   CHOL/HDL Ratio 3.5 01/31/2018 06:26 AM     Lab Results  Component Value Date/Time   ALT (SGPT) 15 01/31/2018 06:26 AM   AST (SGOT) 9 01/31/2018 06:26 AM   Alk. phosphatase 83 01/31/2018 06:26 AM   Bilirubin, total 0.2 01/31/2018 06:26 AM   Albumin 3.5 01/31/2018 06:26 AM   Protein, total 7.6 01/31/2018 06:26 AM   Ammonia 17 01/31/2018 01:23 PM   PLATELET 740 91/87/8534 06:26 AM          CT Results (maximum last 3): Results from [facility] encounter on 01/31/18   CT HEAD WO CONT   Narrative EXAM:  CT HEAD WO CONT    INDICATION:   confusion, headache, history of seizures    COMPARISON: None. CONTRAST:  None. TECHNIQUE: Unenhanced CT of the head was performed using 5 mm images. Brain and  bone windows were generated. CT dose reduction was achieved through use of a  standardized protocol tailored for this examination and automatic exposure  control for dose modulation. FINDINGS:  The ventricles and sulci are normal in size, shape and configuration and  midline. There is no significant white matter disease. There is no intracranial  hemorrhage, extra-axial collection, mass, mass effect or midline shift. The  basilar cisterns are open. No acute infarct is identified. The bone windows  demonstrate no abnormalities. The visualized portions of the paranasal sinuses  and mastoid air cells are clear. There is a nonspecific soft tissue mass in the  left frontal scalp, likely benign. Impression IMPRESSION: No acute intracranial findings. Nonspecific soft tissue mass left  frontal scalp. Assessment and Plan        51-year-old woman who has memory loss suspicious for transient global amnesia which is a TIA equivalent.   Differential also includes confusional migraine. MRI brain has been done. Report is pending. Complete stroke workup with echo with bubble study and telemetry overnight. Fluids. Treat headache with IV magnesium 2 g and if no relief can consider a one-time dose of Depacon 750 mg IV. Watch for any changes overnight. EEG today was unrevealing. Defer anticonvulsants at this time. Following. During this evaluation, we also discussed stroke education to include signs and symptoms of stroke and TIA.        [address] [facility],   NEUROLOGIST  Diplomate ABPN  1/31/2018

## 2018-01-31 NOTE — PROGRESS NOTES
This RN and Reagan Franks RN performed a dual skin assessment on this patient. Impairment noted- see wound doc flow sheet. Puncture site to lower back d/t LP in ER.  Ari score is 22

## 2018-01-31 NOTE — PROGRESS NOTES
Called to speak with Dr Isak Montes about this patient, but he will call me back.     Lino Chan MD, S

## 2018-01-31 NOTE — ED NOTES
Pt states that she feels like pieces of her short term memory are coming back, she is remembering things from the past weekend that she could not recall upon admission.

## 2018-01-31 NOTE — ED PROVIDER NOTES
HPI Comments: Ms. Nikolai Garcia is a 51-year-old female with a past medical history of migraine headaches, seizure disorder, attention, presenting with complaints of altered mental status. Patient began to experience headache yesterday, not as bad as her typical migraine headaches, states she took Excedrin medication for it. Her  states that she woke this morning, very confused. On arrival she is awake, alert, however not oriented, not remembering what she did over the last several days, her place of employment, or what year it is. Both she and her , deny recent fevers, chills, nausea, vomiting, or other signs of illness. She again complains of headaches this morning, states they are frontal in nature, denying other medical complaints at this time.  states her history of seizures, last one 15 years ago, with her behavior this morning reminiscent of postictal state. Patient states she is not compliant with her Lamictal.  states he did not notice any seizure activity overnight. Patient is a 50 y.o. female presenting with altered mental status. The history is provided by the patient and the spouse. No  was used. Altered mental status    This is a new problem. The current episode started 6 to 12 hours ago. The problem has not changed since onset. Associated symptoms include confusion. Pertinent negatives include no somnolence, no seizures, no unresponsiveness, no weakness, no agitation, no delusions, no hallucinations, no self-injury, no violence, no tingling and no numbness. Mental status baseline is normal.  Risk factors include the patient not taking meds correctly. Her past medical history is significant for seizures. Her past medical history does not include diabetes, liver disease, CVA, TIA, AIDS, hypertension, COPD, depression, dementia, psychotropic medication treatment, head trauma or heart disease.         Past Medical History:   Diagnosis Date    Breast cyst 8/21/2014    Carpal tunnel syndrome of left wrist 8/21/2014    Fasciitis 12/21/2012    HTN (hypertension)     Migraine headache     Rosacea     Routine gynecological examination     Seizure disorder [facility])        History reviewed. No pertinent surgical history. Family History:   Problem Relation Age of Onset    Family history unknown: Yes       Social History     Social History    Marital status:      Spouse name: N/A    Number of children: N/A    Years of education: N/A     Occupational History    Not on file. Social History Main Topics    Smoking status: Never Smoker    Smokeless tobacco: Not on file    Alcohol use No    Drug use: No    Sexual activity: Yes     Partners: Male     Other Topics Concern    Not on file     Social History Narrative         ALLERGIES: Review of patient's allergies indicates no known allergies. Review of Systems   Constitutional: Negative for activity change, chills and fever. HENT: Negative for nosebleeds, sore throat, trouble swallowing and voice change. Eyes: Negative for visual disturbance. Respiratory: Negative for shortness of breath. Cardiovascular: Negative for chest pain and palpitations. Gastrointestinal: Negative for abdominal pain, constipation, diarrhea and nausea. Genitourinary: Negative for difficulty urinating, dysuria, hematuria and urgency. Musculoskeletal: Negative for back pain, neck pain and neck stiffness. Skin: Negative for color change. Allergic/Immunologic: Negative for immunocompromised state. Neurological: Positive for headaches. Negative for dizziness, tingling, seizures, syncope, weakness, light-headedness and numbness. Psychiatric/Behavioral: Positive for confusion. Negative for agitation, behavioral problems, hallucinations, self-injury and suicidal ideas.        Vitals:    01/31/18 0614   BP: 138/89   Pulse: 84   Resp: 24   Temp: 98 °F (36.7 °C)   SpO2: 95%   Weight: 90.7 kg (200 lb)   Height: 5' 3\" (1.6 m)            Physical Exam   Constitutional: She appears well-developed and well-nourished. No distress. HENT:   Head: Normocephalic and atraumatic. Eyes: Pupils are equal, round, and reactive to light. Neck: Normal range of motion. Neck supple. Cardiovascular: Normal rate, regular rhythm and normal heart sounds. Exam reveals no gallop and no friction rub. No murmur heard. Pulmonary/Chest: Effort normal and breath sounds normal. No respiratory distress. She has no wheezes. Abdominal: Soft. Bowel sounds are normal. She exhibits no distension. There is no tenderness. There is no rebound and no guarding. Musculoskeletal: Normal range of motion. Neurological: She is alert. She has normal strength. She is disoriented. She displays no atrophy and no tremor. No cranial nerve deficit or sensory deficit. She exhibits normal muscle tone. She displays a negative Romberg sign. She displays no seizure activity. Coordination and gait normal. GCS eye subscore is 4. GCS verbal subscore is 5. GCS motor subscore is 6. Skin: Skin is warm. No rash noted. She is not diaphoretic. Psychiatric: She has a normal mood and affect. Her speech is normal and behavior is normal. Judgment and thought content normal. Cognition and memory are impaired. She exhibits abnormal recent memory. Nursing note and vitals reviewed. [facility]      ED Course     This is a 57-year-old female past medical history, review of systems, physical exam as above, presenting with complaints of confusion. As above patient awake, alert, not oriented, with no recall the last several days, complaining of headache, without other focal complaints. Physical exam remarkable for a well-appearing female, not oriented to year, nonfocal neurologic exam with intact cranial nerves, intact past pointing, intact motor and sensation throughout. Differential includes postictal state, intracranial hemorrhage, CVA, meningitis.  Without physical exam findings, or significant vital sign changes, most of these remain unlikely, with the exception of postictal state. Plan to obtain CMP, CBC, UA, UDS, head CT. Will consider LP, the setting of otherwise unremarkable studies, and make a disposition based on the patient's diagnostics and response to therapy, though it seems likely the patient will require admission for further care and evaluation. LUMBAR PUNCTURE (BEDSIDE), DIAGNOSTIC  Date/Time: 1/31/2018 8:30 AM  Performed by: Jonathon Royal  Authorized by: Jonathon Royal     Consent:     Consent obtained:  Written    Consent given by:  Patient    Risks discussed:  Bleeding, headache, infection, nerve damage, pain and repeat procedure    Alternatives discussed:  No treatment  Pre-procedure details:     Procedure purpose:  Diagnostic    Preparation: Patient was prepped and draped in usual sterile fashion    Sedation:     Sedation type: Anxiolysis  Anesthesia (see MAR for exact dosages): Anesthesia method:  Local infiltration  Procedure details:     Lumbar space:  L4-L5 interspace    Patient position:  Sitting    Needle gauge:  20    Needle type:  Tetlin Longs point    Needle length (in):  3.5    Ultrasound guidance: no      Number of attempts:  2  Post-procedure:     Puncture site:  Adhesive bandage applied and direct pressure applied    Patient tolerance of procedure: Tolerated with difficulty  Comments:      Unsuccessful        7:41 AM  Patient with unremarkable imaging and lab work. Will provide Tylenol, obtain LP and admit for AMS. 8:44 AM  Patient unable to tolerate LP. Will admit to Hospitalist. Patient discussed with Dr. Les Loving for admission.

## 2018-01-31 NOTE — PROCEDURES
ELECTROENCEPHALOGRAM REPORT     Patient Name: Rhys Andrews  : 1965  Age: 50 y.o. Ordering physician: GIOVANNI  Date of EE2018    Interpreting physician: Saul Fields DO      PROCEDURE: EEG. CLINICAL INDICATION: The patient is a 50 y.o. female who is being evaluated for baseline electro cerebral activities and to rule out seizure focus. DESCRIPTION OF THE RECORD:   The background of this recording contains a posteriorly-located occipital alpha rhythm of 8-9 Hz that attenuates with eye opening. There was a normal frequency-amplitude gradient. Throughout the recording, there were neither clear areas of focal slowing nor spike or spike-and-wave discharges seen. Hyperventilation was not performed. Photic stimulation produced a minimal driving response in the posterior head regions. During drowsiness, the background rhythms attenuate and are replaced with diffuse symmetric theta range activities. Later stages of sleep are not attained. INTERPRETATION: This is a normal electroencephalogram with the patient   awake and drowsy, showing no clear focal abnormalities or epileptiform   activity. A normal EEG does not rule out seizures. Clinical correlation recommended.           [address] [place] [address],   Diplomate, American Board of Psychiatry & Neurology (Neurology)

## 2018-01-31 NOTE — IP AVS SNAPSHOT
[facility] [address] [place] [address] 
[phone] Patient: John Pena MRN: [id number] :1965 About your hospitalization You were admitted on:  2018 You last received care in the:  [facility] 6S NEURO-SCI TELE You were discharged on:  2018 Why you were hospitalized Your primary diagnosis was:  Retrograde Amnesia Your diagnoses also included:  Seizure Disorder (Hcc), Htn (Hypertension), Acute Encephalopathy, Obesity (Bmi 30-39.9), Tga (Transient Global Amnesia) Follow-up Information Follow up With Details Comments Contact Info Herlinda Peña MD On 2018 Hospital follow up PCP appointment on Tuesday, 18 @ 4:15 p.m.  [address] [facility] [address] [place] [address] 
[phone] [address] [place] [address] In 1 month  [address] [place] [address] [address] [facility] Neurology [facility] [address] [place] [address] 
[phone] Your Scheduled Appointments 2018  4:15 PM EST TRANSITIONAL CARE MANAGEMENT with Herlinda Peña MD  
[facility] Pediatrics and Internal Medicine [facility]) [facility] [address] [place] [address]  
[phone] Discharge Orders None A check juanito indicates which time of day the medication should be taken. My Medications START taking these medications Instructions Each Dose to Equal  
 Morning Noon Evening Bedtime  
 aspirin delayed-release 81 mg tablet Start taking on:  2018 Your last dose was: Your next dose is: Take 1 Tab by mouth daily. 81 mg  
    
   
   
   
  
 methylPREDNISolone 4 mg tablet Commonly known as:  [place] Your last dose was: Your next dose is: Follow instructions on the pack . CONTINUE taking these medications  Instructions Each Dose to Equal  
 Morning Noon Evening Bedtime  
 ibuprofen 800 mg tablet Commonly known as:  MOTRIN Your last dose was: Your next dose is: Take 1 Tab by mouth every eight (8) hours as needed for Pain. Take with food to minimize stomach discomfort. 800 mg  
    
   
   
   
  
 SUMAtriptan 100 mg tablet Commonly known as:  IMITREX Your last dose was: Your next dose is: TAKE 1 TABLET BY MOUTH ONCE AS NEEDED MAY REPEAT 1 IN 2 HOURS AS NEEDED Where to Get Your Medications Information on where to get these meds will be given to you by the nurse or doctor. ! Ask your nurse or doctor about these medications  
  aspirin delayed-release 81 mg tablet  
 methylPREDNISolone 4 mg tablet Discharge Instructions Discharge Instructions PATIENT ID: Joanne Oneil MRN: [id number] YOB: 1965 DATE OF ADMISSION: 1/31/2018  6:04 AM   
DATE OF DISCHARGE: 2/1/2018 PRIMARY CARE PROVIDER: Cuca Mcqueen MD  
 
ATTENDING PHYSICIAN: Sudhakar Marcos MD 
DISCHARGING PROVIDER: Sudhakar Marcos MD   
To contact this individual call [phone] and ask the  to page. If unavailable ask to be transferred the Adult Hospitalist Department. DISCHARGE DIAGNOSES Headache CONSULTATIONS: IP CONSULT TO HOSPITALIST 
IP CONSULT TO NEUROLOGY PROCEDURES/SURGERIES: * No surgery found * PENDING TEST RESULTS:  
At the time of discharge the following test results are still pending: FOLLOW UP APPOINTMENTS:  
Follow-up Information Follow up With Details Comments Contact Info Cuca Mcqueen MD In 1 week  [address] [facility] [place] [address] 
[phone] ADDITIONAL CARE RECOMMENDATIONS:  
Please take aspirin daily and medrol dose pack for headache Shunt study was indeterminate , can get it done with pcp or neurologist outside again and would with echo with bubble study in some time . They can order for you DIET: Regular Diet ACTIVITY: Activity as tolerated WOUND CARE:  
 
EQUIPMENT needed:  
 
 
  
 SNF/Inpatient Rehab/LTAC Independent/assisted living Hospice Other: CDMP Checked:  
Yes x PROBLEM LIST Updated: 
Yes x Signed:  
Israel Nathan MD 
2/1/2018 
2:48 PM 
 
  
  
  
[facility] Announcement We are excited to announce that we are making your provider's discharge notes available to you in [facility]. You will see these notes when they are completed and signed by the physician that discharged you from your recent hospital stay. If you have any questions or concerns about any information you see in [employer], please call the Health Information Department where you were seen or reach out to your Primary Care Provider for more information about your plan of care. Introducing \[facility]\"" & HEALTH SERVICES! Dear Nacho Hernandez: 
Thank you for requesting a [facility] account. Our records indicate that you have previously registered for a [facility] account but its currently inactive. Please call our [facility] support line at [phone]. Additional Information If you have questions, please visit the Frequently Asked Questions section of the MyChart website at [email or web address] Remember, MyChart is NOT to be used for urgent needs. For medical emergencies, dial 911. Now available from your iPhone and Android! Unresulted Labs-Please follow up with your PCP about these lab tests Order Current Status DUPLEX CAROTID BILATERAL Preliminary result Providers Seen During Your Hospitalization Provider Specialty Primary office phone Jessica Shaffer MD Emergency Medicine [phone] Winston Akins MD Internal Medicine [phone] Cheryl Cabrera MD Internal Medicine [phone] Your Primary Care Physician (PCP) Primary Care Physician Office Phone Office Fax Aidan Phillip [phone] You are allergic to the following No active allergies Recent Documentation Height Weight Breastfeeding? BMI OB Status Smoking Status 1.6 m 90.8 kg No 35.46 kg/m2 Having regular periods Never Smoker Emergency Contacts Name Discharge Info Relation Home Work Mobile [facility] DISCHARGE CAREGIVER [3] Spouse [3] [phone] Patient Belongings The following personal items are in your possession at time of discharge: 
  Dental Appliances: None  Visual Aid: Contacts, With patient      Home Medications: None   Jewelry: Ring (Earrings sent home)  Clothing: At bedside, Shirt, Pants    Other Valuables: Cell Phone, Contact Lenses ( for cell phone) Please provide this summary of care documentation to your next provider. Signatures-by signing, you are acknowledging that this After Visit Summary has been reviewed with you and you have received a copy. Patient Signature:  ____________________________________________________________  Date:  ____________________________________________________________  
  
Neela Deshpande    
    
 Provider Signature:  ____________________________________________________________ Date:  ____________________________________________________________

## 2018-01-31 NOTE — IP AVS SNAPSHOT
[address] [address] 
[phone] Patient: Mirna Anderson MRN: [id number] :1965 A check juanito indicates which time of day the medication should be taken. My Medications START taking these medications Instructions Each Dose to Equal  
 Morning Noon Evening Bedtime  
 aspirin delayed-release 81 mg tablet Start taking on:  2018 Your last dose was: Your next dose is: Take 1 Tab by mouth daily. 81 mg  
    
   
   
   
  
 methylPREDNISolone 4 mg tablet Commonly known as:  [place] Your last dose was: Your next dose is: Follow instructions on the pack . CONTINUE taking these medications Instructions Each Dose to Equal  
 Morning Noon Evening Bedtime  
 ibuprofen 800 mg tablet Commonly known as:  MOTRIN Your last dose was: Your next dose is: Take 1 Tab by mouth every eight (8) hours as needed for Pain. Take with food to minimize stomach discomfort. 800 mg  
    
   
   
   
  
 SUMAtriptan 100 mg tablet Commonly known as:  IMITREX Your last dose was: Your next dose is: TAKE 1 TABLET BY MOUTH ONCE AS NEEDED MAY REPEAT 1 IN 2 HOURS AS NEEDED Where to Get Your Medications Information on where to get these meds will be given to you by the nurse or doctor. ! Ask your nurse or doctor about these medications  
  aspirin delayed-release 81 mg tablet  
 methylPREDNISolone 4 mg tablet

## 2018-01-31 NOTE — ED TRIAGE NOTES
TRIAGE NOTE: patient arrived from home with c/o altered mental status. Last known well was 10pm. +headache. Denies numbness, weakness or tingling. Oriented x3. Hx of seizures.

## 2018-02-01 VITALS
RESPIRATION RATE: 12 BRPM | HEIGHT: 63 IN | TEMPERATURE: 98.9 F | WEIGHT: 200.18 LBS | BODY MASS INDEX: 35.47 KG/M2 | HEART RATE: 73 BPM | SYSTOLIC BLOOD PRESSURE: 136 MMHG | OXYGEN SATURATION: 96 % | DIASTOLIC BLOOD PRESSURE: 74 MMHG

## 2018-02-01 LAB
ALBUMIN SERPL-MCNC: 3.2 G/DL (ref 3.5–5)
ALBUMIN/GLOB SERPL: 0.8 {RATIO} (ref 1.1–2.2)
ALP SERPL-CCNC: 74 U/L (ref 45–117)
ALT SERPL-CCNC: 14 U/L (ref 12–78)
ANION GAP SERPL CALC-SCNC: 6 MMOL/L (ref 5–15)
AST SERPL-CCNC: 7 U/L (ref 15–37)
BASOPHILS # BLD: 0 K/UL (ref 0–0.1)
BASOPHILS NFR BLD: 1 % (ref 0–1)
BILIRUB SERPL-MCNC: 0.2 MG/DL (ref 0.2–1)
BUN SERPL-MCNC: 14 MG/DL (ref 6–20)
BUN/CREAT SERPL: 13 (ref 12–20)
CALCIUM SERPL-MCNC: 8.5 MG/DL (ref 8.5–10.1)
CHLORIDE SERPL-SCNC: 107 MMOL/L (ref 97–108)
CO2 SERPL-SCNC: 25 MMOL/L (ref 21–32)
CREAT SERPL-MCNC: 1.08 MG/DL (ref 0.55–1.02)
DIFFERENTIAL METHOD BLD: NORMAL
EOSINOPHIL # BLD: 0.1 K/UL (ref 0–0.4)
EOSINOPHIL NFR BLD: 2 % (ref 0–7)
ERYTHROCYTE [DISTWIDTH] IN BLOOD BY AUTOMATED COUNT: 12.7 % (ref 11.5–14.5)
GLOBULIN SER CALC-MCNC: 3.9 G/DL (ref 2–4)
GLUCOSE SERPL-MCNC: 97 MG/DL (ref 65–100)
HCT VFR BLD AUTO: 38.1 % (ref 35–47)
HGB BLD-MCNC: 12.7 G/DL (ref 11.5–16)
IMM GRANULOCYTES # BLD: 0 K/UL (ref 0–0.04)
IMM GRANULOCYTES NFR BLD AUTO: 0 % (ref 0–0.5)
LAMOTRIGINE SERPL-MCNC: NORMAL UG/ML (ref 2–20)
LYMPHOCYTES # BLD: 2.4 K/UL (ref 0.8–3.5)
LYMPHOCYTES NFR BLD: 32 % (ref 12–49)
MCH RBC QN AUTO: 29.4 PG (ref 26–34)
MCHC RBC AUTO-ENTMCNC: 33.3 G/DL (ref 30–36.5)
MCV RBC AUTO: 88.2 FL (ref 80–99)
MONOCYTES # BLD: 0.5 K/UL (ref 0–1)
MONOCYTES NFR BLD: 7 % (ref 5–13)
NEUTS SEG # BLD: 4.3 K/UL (ref 1.8–8)
NEUTS SEG NFR BLD: 58 % (ref 32–75)
NRBC # BLD: 0 K/UL (ref 0–0.01)
NRBC BLD-RTO: 0 PER 100 WBC
PLATELET # BLD AUTO: 375 K/UL (ref 150–400)
PMV BLD AUTO: 9.4 FL (ref 8.9–12.9)
POTASSIUM SERPL-SCNC: 4 MMOL/L (ref 3.5–5.1)
PROT SERPL-MCNC: 7.1 G/DL (ref 6.4–8.2)
RBC # BLD AUTO: 4.32 M/UL (ref 3.8–5.2)
SODIUM SERPL-SCNC: 138 MMOL/L (ref 136–145)
WBC # BLD AUTO: 7.4 K/UL (ref 3.6–11)

## 2018-02-01 PROCEDURE — 96374 THER/PROPH/DIAG INJ IV PUSH: CPT

## 2018-02-01 PROCEDURE — 74011250636 HC RX REV CODE- 250/636: Performed by: INTERNAL MEDICINE

## 2018-02-01 PROCEDURE — 93308 TTE F-UP OR LMTD: CPT | Performed by: INTERNAL MEDICINE

## 2018-02-01 PROCEDURE — 99218 HC RM OBSERVATION: CPT

## 2018-02-01 PROCEDURE — 96372 THER/PROPH/DIAG INJ SC/IM: CPT

## 2018-02-01 PROCEDURE — 93306 TTE W/DOPPLER COMPLETE: CPT | Performed by: PSYCHIATRY & NEUROLOGY

## 2018-02-01 PROCEDURE — 74011250637 HC RX REV CODE- 250/637: Performed by: PSYCHIATRY & NEUROLOGY

## 2018-02-01 PROCEDURE — 96367 TX/PROPH/DG ADDL SEQ IV INF: CPT

## 2018-02-01 PROCEDURE — 74011250637 HC RX REV CODE- 250/637: Performed by: INTERNAL MEDICINE

## 2018-02-01 PROCEDURE — 36415 COLL VENOUS BLD VENIPUNCTURE: CPT | Performed by: INTERNAL MEDICINE

## 2018-02-01 PROCEDURE — 80053 COMPREHEN METABOLIC PANEL: CPT | Performed by: INTERNAL MEDICINE

## 2018-02-01 PROCEDURE — 85025 COMPLETE CBC W/AUTO DIFF WBC: CPT | Performed by: INTERNAL MEDICINE

## 2018-02-01 PROCEDURE — 93880 EXTRACRANIAL BILAT STUDY: CPT

## 2018-02-01 PROCEDURE — 74011000258 HC RX REV CODE- 258: Performed by: INTERNAL MEDICINE

## 2018-02-01 PROCEDURE — 74011000250 HC RX REV CODE- 250: Performed by: INTERNAL MEDICINE

## 2018-02-01 RX ORDER — KETOROLAC TROMETHAMINE 30 MG/ML
30 INJECTION, SOLUTION INTRAMUSCULAR; INTRAVENOUS ONCE
Status: COMPLETED | OUTPATIENT
Start: 2018-02-01 | End: 2018-02-01

## 2018-02-01 RX ORDER — MAGNESIUM SULFATE HEPTAHYDRATE 40 MG/ML
2 INJECTION, SOLUTION INTRAVENOUS ONCE
Status: DISCONTINUED | OUTPATIENT
Start: 2018-02-01 | End: 2018-02-01

## 2018-02-01 RX ORDER — METHYLPREDNISOLONE 4 MG/1
TABLET ORAL
Qty: 1 DOSE PACK | Refills: 0 | Status: SHIPPED | OUTPATIENT
Start: 2018-02-01 | End: 2018-06-11 | Stop reason: ALTCHOICE

## 2018-02-01 RX ORDER — ASPIRIN 81 MG/1
81 TABLET ORAL DAILY
Qty: 90 TAB | Refills: 0 | Status: SHIPPED | OUTPATIENT
Start: 2018-02-02

## 2018-02-01 RX ADMIN — ENOXAPARIN SODIUM 40 MG: 40 INJECTION SUBCUTANEOUS at 15:24

## 2018-02-01 RX ADMIN — ASPIRIN 81 MG: 81 TABLET, COATED ORAL at 09:44

## 2018-02-01 RX ADMIN — Medication 10 ML: at 06:25

## 2018-02-01 RX ADMIN — ACETAMINOPHEN 650 MG: 325 TABLET, FILM COATED ORAL at 15:28

## 2018-02-01 RX ADMIN — ACETAMINOPHEN 650 MG: 325 TABLET, FILM COATED ORAL at 02:34

## 2018-02-01 RX ADMIN — ACETAMINOPHEN 650 MG: 325 TABLET, FILM COATED ORAL at 06:43

## 2018-02-01 RX ADMIN — SODIUM CHLORIDE 750 MG: 900 INJECTION, SOLUTION INTRAVENOUS at 10:23

## 2018-02-01 RX ADMIN — KETOROLAC TROMETHAMINE 30 MG: 30 INJECTION, SOLUTION INTRAMUSCULAR at 09:45

## 2018-02-01 RX ADMIN — Medication 10 ML: at 15:23

## 2018-02-01 NOTE — DISCHARGE SUMMARY
Discharge Summary       PATIENT ID: Dewayne Gerber  MRN: [id number]   YOB: 1965    DATE OF ADMISSION: 1/31/2018  6:04 AM    DATE OF DISCHARGE: 2/1/18   PRIMARY CARE PROVIDER: Arnoldo Ramirez MD     ATTENDING PHYSICIAN: Brenda Nielsen   DISCHARGING PROVIDER: Amari Andrews MD    To contact this individual call [phone] and ask the  to page. If unavailable ask to be transferred the Adult Hospitalist Department. CONSULTATIONS: IP CONSULT TO HOSPITALIST  IP CONSULT TO NEUROLOGY    PROCEDURES/SURGERIES: * No surgery found *    ADMITTING [address] [place] [address] COURSE:     Dewayne Gerber is a 50 y.o. female with seizure disorder, obesity, HTN, and migraine HAs who presents to the ED from home with confusion and inability to remember events for the last few days. Patient and  report that pt woke up confused this morning. She states she cannot remember events from the last 4-5 days. She reports last seizure was 10-12 years ago and has not taken Lamictal in a few years (unbeknownst to her ). She has not seen a neurologist in many years. She denies any recent changes in meds, travel, exposures, changes in diet or activity and cannot recall any inciting factor for this memory loss or sudden onset confusion.  reports that she had confusion one time after her postpartum seizure. She c/o headache but not like her usual migraines, lightheadedness, and some dizziness. She denies f/c/n/v, CP, SOB, cough, abdominal pain, dysuria, diarrhea/constipation, weight changes, numbness, tingling, weakness, LOC, audiovisual deficits, falls/traumas.      Of note, patient reported being attacked by a donkey November 2017 with bites, scratches, abrasions and completing a course of antibiotics.     ED triage VS were temp 98F, HR 84, /89, RR 24, SpO2 95% on RA. In the ED, she received APAP 1g po x1 and lorazepam 1mg po x1. ED MD attempted an LP at bedside but was unsuccessful.        CT Results  (Last 48 hours)               01/31/18 0647  CT HEAD WO CONT Final result    Impression:  IMPRESSION: No acute intracranial findings. Nonspecific soft tissue mass left   frontal scalp. Narrative:  EXAM:  CT HEAD WO CONT       INDICATION:   confusion, headache, history of seizures       COMPARISON: None. CONTRAST:  None. TECHNIQUE: Unenhanced CT of the head was performed using 5 mm images. Brain and   bone windows were generated. CT dose reduction was achieved through use of a   standardized protocol tailored for this examination and automatic exposure   control for dose modulation. FINDINGS:   The ventricles and sulci are normal in size, shape and configuration and   midline. There is no significant white matter disease. There is no intracranial   hemorrhage, extra-axial collection, mass, mass effect or midline shift. The   basilar cisterns are open. No acute infarct is identified. The bone windows   demonstrate no abnormalities. The visualized portions of the paranasal sinuses   and mastoid air cells are clear. There is a nonspecific soft tissue mass in the   left frontal scalp, likely benign. Recent Results (from the past 24 hour(s))   METABOLIC PANEL, COMPREHENSIVE    Collection Time: 02/01/18  2:26 AM   Result Value Ref Range    Sodium 138 136 - 145 mmol/L    Potassium 4.0 3.5 - 5.1 mmol/L    Chloride 107 97 - 108 mmol/L    CO2 25 21 - 32 mmol/L    Anion gap 6 5 - 15 mmol/L    Glucose 97 65 - 100 mg/dL    BUN 14 6 - 20 MG/DL    Creatinine 1.08 (H) 0.55 - 1.02 MG/DL    BUN/Creatinine ratio 13 12 - 20      GFR est AA >60 >60 ml/min/1.73m2    GFR est non-AA 54 (L) >60 ml/min/1.73m2    Calcium 8.5 8.5 - 10.1 MG/DL    Bilirubin, total 0.2 0.2 - 1.0 MG/DL    ALT (SGPT) 14 12 - 78 U/L    AST (SGOT) 7 (L) 15 - 37 U/L    Alk.  phosphatase 74 45 - 117 U/L    Protein, total 7.1 6.4 - 8.2 g/dL    Albumin 3.2 (L) 3.5 - 5.0 g/dL    Globulin 3.9 2.0 - 4.0 g/dL    A-G Ratio 0.8 (L) 1.1 - 2.2     CBC WITH AUTOMATED DIFF    Collection Time: 02/01/18  2:26 AM   Result Value Ref Range    WBC 7.4 3.6 - 11.0 K/uL    RBC 4.32 3.80 - 5.20 M/uL    HGB 12.7 11.5 - 16.0 g/dL    HCT 38.1 35.0 - 47.0 %    MCV 88.2 80.0 - 99.0 FL    MCH 29.4 26.0 - 34.0 PG    MCHC 33.3 30.0 - 36.5 g/dL    RDW 12.7 11.5 - 14.5 %    PLATELET 278 295 - 084 K/uL    MPV 9.4 8.9 - 12.9 FL    NRBC 0.0 0  WBC    ABSOLUTE NRBC 0.00 0.00 - 0.01 K/uL    NEUTROPHILS 58 32 - 75 %    LYMPHOCYTES 32 12 - 49 %    MONOCYTES 7 5 - 13 %    EOSINOPHILS 2 0 - 7 %    BASOPHILS 1 0 - 1 %    IMMATURE GRANULOCYTES 0 0.0 - 0.5 %    ABS. NEUTROPHILS 4.3 1.8 - 8.0 K/UL    ABS. LYMPHOCYTES 2.4 0.8 - 3.5 K/UL    ABS. MONOCYTES 0.5 0.0 - 1.0 K/UL    ABS. EOSINOPHILS 0.1 0.0 - 0.4 K/UL    ABS. BASOPHILS 0.0 0.0 - 0.1 K/UL    ABS. IMM. GRANS. 0.0 0.00 - 0.04 K/UL    DF AUTOMATED         MRI     The ventricles are midline without hydrocephalus. No acute or chronic  intracranial hemorrhage. No mass lesion, mass effect or midline shift. Single  small 2 to 3 mm high signal lesion is noted in the left parietal periventricular  white matter. There is no acute infarction. The major intracranial vascular  flow-voids are patent. There is no abnormal parenchymal or meningeal  enhancement. Marrow signal is normal. There is no volume loss within the  temporal lobes or abnormal signal. There is an oval low signal lesion within the  scalp.  This may represent a sebaceous cyst        Retrograde amnesia (POA)  - pt seen/examined in Ed on 1/31/18; place in OBS NSTU  - CT head unremarkable  - check MRI brain : NO ACUTE ABNORMALITY  TSH, folate 17.4, B12 338   - consult Neuro , has seen patient , starte don asa to prevent TIA , also note that she had headache and Mag and Depakoate and toradol given for same and she responded though still has headache and was discharge don medrol fran      Active Problems:    Seizure disorder  - last reported seizure 10-12 years ago and has not taken Lamictal in many years  - check EEG and lamotrigine level  - seizure precautions  - No AED recommended by the neurologist        HTN (hypertension) - BP elevated on admission but now controlled; no antihypertensives on PTA med list       Acute encephalopathy (POA) - seems resolved to baseline but now just with persistent retrograde amnesia     Obesity, Body mass index is 35.43 kg/(m^2). DISCHARGE DIAGNOSES / PLAN:       see above , follow up with Neurologist        PENDING TEST RESULTS:   At the time of discharge the following test results are still pending:     FOLLOW UP APPOINTMENTS:    Follow-up Information     Follow up With Details Comments Contact Info    Wes Watts MD In 1 week  [address] [facility]  [address] [facility] [address], DO In 1 month  [address] [facility]  [address] [facility] [address] [facility]  [phone]             ADDITIONAL CARE RECOMMENDATIONS:   Follow up as in discharge instructions     DIET: Regular Diet  Oral Nutritional Supplements:   ACTIVITY: Activity as tolerated    WOUND CARE:     EQUIPMENT needed:       DISCHARGE MEDICATIONS:  Current Discharge Medication List      START taking these medications    Details   aspirin delayed-release 81 mg tablet Take 1 Tab by mouth daily. Qty: 90 Tab, Refills: 0      methylPREDNISolone (MEDROL DOSEPACK) 4 mg tablet Follow instructions on the pack . Qty: 1 Dose Pack, Refills: 0         CONTINUE these medications which have NOT CHANGED    Details   SUMAtriptan (IMITREX) 100 mg tablet TAKE 1 TABLET BY MOUTH ONCE AS NEEDED MAY REPEAT 1 IN 2 HOURS AS NEEDED  Qty: 20 Tab, Refills: 3    Associated Diagnoses: Migraine without status migrainosus, not intractable, unspecified migraine type      ibuprofen (MOTRIN) 800 mg tablet Take 1 Tab by mouth every eight (8) hours as needed for Pain. Take with food to minimize stomach discomfort.   Qty: 40 Tab, Refills: 1    Associated Diagnoses: Multiple contusions; Animal bite               NOTIFY YOUR PHYSICIAN FOR ANY OF THE FOLLOWING:   Fever over 101 degrees for 24 hours. Chest pain, shortness of breath, fever, chills, nausea, vomiting, diarrhea, change in mentation, falling, weakness, bleeding. Severe pain or pain not relieved by medications. Or, any other signs or symptoms that you may have questions about.     DISPOSITION:  xx  Home With:   OT  PT  HH  RN       Long term SNF/Inpatient Rehab    Independent/assisted living    Hospice    Other:       PATIENT CONDITION AT DISCHARGE:     Functional status    Poor     Deconditioned    x Independent      Cognition    x Lucid     Forgetful     Dementia      Catheters/lines (plus indication)    Thompson     PICC     PEG    x None      Code status    x Full code     DNR      PHYSICAL EXAMINATION AT DISCHARGE:   Refer to Progress Note      CHRONIC MEDICAL DIAGNOSES:  Problem List as of 2/1/2018  Date Reviewed: 1/31/2018          Codes Class Noted - Resolved    * (Principal)Retrograde amnesia ICD-10-CM: R41.2  ICD-9-CM: 780.93  1/31/2018 - Present        Acute encephalopathy ICD-10-CM: G93.40  ICD-9-CM: 348.30  1/31/2018 - Present        Obesity (BMI 30-39.9) (Chronic) ICD-10-CM: E66.9  ICD-9-CM: 278.00  1/31/2018 - Present        TGA (transient global amnesia) ICD-10-CM: G45.4  ICD-9-CM: 437.7  1/31/2018 - Present        B12 deficiency ICD-10-CM: E53.8  ICD-9-CM: 266.2  1/12/2016 - Present        Vitamin D deficiency ICD-10-CM: E55.9  ICD-9-CM: 268.9  1/12/2016 - Present        HTN (hypertension) ICD-10-CM: I10  ICD-9-CM: 401.9  8/21/2014 - Present        Breast cyst ICD-10-CM: N60.09  ICD-9-CM: 610.0  8/21/2014 - Present        Abnormal mammogram ICD-10-CM: R92.8  ICD-9-CM: 793.80  8/21/2014 - Present        Rosacea ICD-10-CM: L71.9  ICD-9-CM: 695.3  Unknown - Present        Routine gynecological examination ICD-10-CM: Z01.419  ICD-9-CM: V72.31  Unknown - Present        Carpal tunnel syndrome of left wrist ICD-10-CM: G56.02  ICD-9-CM: 354.0  8/21/2014 - Present        Fasciitis ICD-10-CM: M72.9  ICD-9-CM: 729.4  12/21/2012 - Present        Migraine headache ICD-10-CM: G43.909  ICD-9-CM: 346.90  Unknown - Present        Seizure disorder ([facility] [address] ICD-10-CM: G40.909  ICD-9-CM: 345.90  Unknown - Present              Greater than 20  minutes were spent with the patient on counseling and coordination of care    Signed:   Mirian Howell MD  2/1/2018  2:53 PM

## 2018-02-01 NOTE — PROGRESS NOTES
Bedside shift change report given to [address] [place] [address] (oncoming nurse) by Alireza Mitchell (offgoing nurse).  Report included the following information SBAR, Kardex, ED Summary, Intake/Output, MAR, Recent Results and Cardiac Rhythm SR.

## 2018-02-01 NOTE — PROGRESS NOTES
Problem: Falls - Risk of  Goal: *Absence of Falls  Document Maryann Fall Risk and appropriate interventions in the flowsheet.    Outcome: Progressing Towards Goal  Fall Risk Interventions:  Mobility Interventions: Bed/chair exit alarm

## 2018-02-01 NOTE — PROGRESS NOTES
Problem: Falls - Risk of  Goal: *Absence of Falls  Document Maryann Fall Risk and appropriate interventions in the flowsheet.    Outcome: Progressing Towards Goal  Fall Risk Interventions:  Mobility Interventions: Bed/chair exit alarm, Communicate number of staff needed for ambulation/transfer, Patient to call before getting OOB, Utilize gait belt for transfers/ambulation

## 2018-02-01 NOTE — PROGRESS NOTES
Problem: Falls - Risk of  Goal: *Absence of Falls  Document Maryann Fall Risk and appropriate interventions in the flowsheet.    Outcome: Progressing Towards Goal  Fall Risk Interventions:  Mobility Interventions: Assess mobility with egress test, OT consult for ADLs, Patient to call before getting OOB, PT Consult for mobility concerns, PT Consult for assist device competence         Medication Interventions: Patient to call before getting OOB, Teach patient to arise slowly

## 2018-02-01 NOTE — DISCHARGE INSTRUCTIONS
Discharge Instructions       PATIENT ID: Epifanio Hobbs  MRN: [id number]   YOB: 1965    DATE OF ADMISSION: 1/31/2018  6:04 AM    DATE OF DISCHARGE: 2/1/2018    PRIMARY CARE PROVIDER: Dee Calero MD     ATTENDING PHYSICIAN: Ky Miller MD  DISCHARGING PROVIDER: Ky Miller MD    To contact this individual call [phone] and ask the  to page. If unavailable ask to be transferred the Adult Hospitalist Department. DISCHARGE DIAGNOSES   Headache     CONSULTATIONS: IP CONSULT TO HOSPITALIST  IP CONSULT TO NEUROLOGY    PROCEDURES/SURGERIES: * No surgery found *    PENDING TEST RESULTS:   At the time of discharge the following test results are still pending:     FOLLOW UP APPOINTMENTS:   Follow-up Information     Follow up With Details Comments Contact Info    Dee Calero MD In 1 week  [address]  [phone]             ADDITIONAL CARE RECOMMENDATIONS:   Please take aspirin daily and medrol dose pack for headache   Shunt study was indeterminate , can get it done with pcp or neurologist outside again and would with echo with bubble study in some time . They can order for you     DIET: Regular Diet      ACTIVITY: Activity as tolerated    WOUND CARE:     EQUIPMENT needed:       DISCHARGE MEDICATIONS:   See Medication Reconciliation Form    · It is important that you take the medication exactly as they are prescribed. · Keep your medication in the bottles provided by the pharmacist and keep a list of the medication names, dosages, and times to be taken in your wallet. · Do not take other medications without consulting your doctor. NOTIFY YOUR PHYSICIAN FOR ANY OF THE FOLLOWING:   Fever over 101 degrees for 24 hours. Chest pain, shortness of breath, fever, chills, nausea, vomiting, diarrhea, change in mentation, falling, weakness, bleeding. Severe pain or pain not relieved by medications.   Or, any other signs or symptoms that you may have questions about.       DISPOSITION:   xx Home With:   OT  PT  HH  RN       SNF/Inpatient Rehab/LTAC    Independent/assisted living    Hospice    Other:     CDMP Checked:   Yes x     PROBLEM LIST Updated:  Yes x       Signed:   Laura Cruz MD  2/1/2018  2:48 PM

## 2018-02-01 NOTE — CONSULTS
Neurology Progress Note    Patient ID:  Kevin Padilla  [id number]  50 y.o.  1965    Chief Complaint: Memory loss    Subjective:     82-year-old woman with migraines and hypertension admitted for memory loss suspicious for TGA. She is having some fragments memory return very slowly. MRI brain is negative. She still has a mild migraine headache today despite magnesium infusion. Objective:       ROS:  Per HPI  All other 12 pt ROS negative    Meds:  Current Facility-Administered Medications   Medication Dose Route Frequency    sodium chloride (NS) flush 5-10 mL  5-10 mL IntraVENous Q8H    sodium chloride (NS) flush 5-10 mL  5-10 mL IntraVENous PRN    acetaminophen (TYLENOL) tablet 650 mg  650 mg Oral Q4H PRN    ondansetron (ZOFRAN) injection 4 mg  4 mg IntraVENous Q4H PRN    enoxaparin (LOVENOX) injection 40 mg  40 mg SubCUTAneous Q24H    aspirin delayed-release tablet 81 mg  81 mg Oral DAILY       MRI Results (maximum last 3): Results from Hospital Encounter encounter on 01/31/18   MRI BRAIN W WO CONT   Narrative HISTORY:  New onset amnesia with history of seizure disorder    COMPARISON:  Head CT same day    TECHNIQUE:  MR imaging of the brain was performed with sagittal T1, axial T1,  T2, FLAIR, GRE, DWI/ADC; pre and post contrast multiplanar T1 utilizing 18 mL of  ProHance. Coronal FLAIR and T2.    FINDINGS:      The ventricles are midline without hydrocephalus. No acute or chronic  intracranial hemorrhage. No mass lesion, mass effect or midline shift. Single  small 2 to 3 mm high signal lesion is noted in the left parietal periventricular  white matter. There is no acute infarction. The major intracranial vascular  flow-voids are patent. There is no abnormal parenchymal or meningeal  enhancement. Marrow signal is normal. There is no volume loss within the  temporal lobes or abnormal signal. There is an oval low signal lesion within the  scalp.  This may represent a sebaceous cyst Impression IMPRESSION:  1. No acute intracranial abnormality. Lab Review   Recent Results (from the past 24 hour(s))   AMMONIA    Collection Time: 01/31/18  1:23 PM   Result Value Ref Range    Ammonia 17 <07 UMOL/L   METABOLIC PANEL, COMPREHENSIVE    Collection Time: 02/01/18  2:26 AM   Result Value Ref Range    Sodium 138 136 - 145 mmol/L    Potassium 4.0 3.5 - 5.1 mmol/L    Chloride 107 97 - 108 mmol/L    CO2 25 21 - 32 mmol/L    Anion gap 6 5 - 15 mmol/L    Glucose 97 65 - 100 mg/dL    BUN 14 6 - 20 MG/DL    Creatinine 1.08 (H) 0.55 - 1.02 MG/DL    BUN/Creatinine ratio 13 12 - 20      GFR est AA >60 >60 ml/min/1.73m2    GFR est non-AA 54 (L) >60 ml/min/1.73m2    Calcium 8.5 8.5 - 10.1 MG/DL    Bilirubin, total 0.2 0.2 - 1.0 MG/DL    ALT (SGPT) 14 12 - 78 U/L    AST (SGOT) 7 (L) 15 - 37 U/L    Alk. phosphatase 74 45 - 117 U/L    Protein, total 7.1 6.4 - 8.2 g/dL    Albumin 3.2 (L) 3.5 - 5.0 g/dL    Globulin 3.9 2.0 - 4.0 g/dL    A-G Ratio 0.8 (L) 1.1 - 2.2     CBC WITH AUTOMATED DIFF    Collection Time: 02/01/18  2:26 AM   Result Value Ref Range    WBC 7.4 3.6 - 11.0 K/uL    RBC 4.32 3.80 - 5.20 M/uL    HGB 12.7 11.5 - 16.0 g/dL    HCT 38.1 35.0 - 47.0 %    MCV 88.2 80.0 - 99.0 FL    MCH 29.4 26.0 - 34.0 PG    MCHC 33.3 30.0 - 36.5 g/dL    RDW 12.7 11.5 - 14.5 %    PLATELET 863 719 - 333 K/uL    MPV 9.4 8.9 - 12.9 FL    NRBC 0.0 0  WBC    ABSOLUTE NRBC 0.00 0.00 - 0.01 K/uL    NEUTROPHILS 58 32 - 75 %    LYMPHOCYTES 32 12 - 49 %    MONOCYTES 7 5 - 13 %    EOSINOPHILS 2 0 - 7 %    BASOPHILS 1 0 - 1 %    IMMATURE GRANULOCYTES 0 0.0 - 0.5 %    ABS. NEUTROPHILS 4.3 1.8 - 8.0 K/UL    ABS. LYMPHOCYTES 2.4 0.8 - 3.5 K/UL    ABS. MONOCYTES 0.5 0.0 - 1.0 K/UL    ABS. EOSINOPHILS 0.1 0.0 - 0.4 K/UL    ABS. BASOPHILS 0.0 0.0 - 0.1 K/UL    ABS. IMM. GRANS. 0.0 0.00 - 0.04 K/UL    DF AUTOMATED         Additional comments:I reviewed the patient's new clinical lab test results.   and I reviewed the patients new imaging test results. Patient Vitals for the past 8 hrs:   BP Temp Pulse Resp SpO2   02/01/18 1100 133/82 98.4 °F (36.9 °C) 71 24 -   02/01/18 0800 - - - - 97 %   02/01/18 0700 131/83 97.5 °F (36.4 °C) 64 17 98 %          01/30 1901 - 02/01 0700  In: 480 [P.O.:480]  Out: -     Exam:  Visit Vitals    /82 (BP 1 Location: Right arm, BP Patient Position: At rest)    Pulse 71    Temp 98.4 °F (36.9 °C)    Resp 24    Ht 5' 3\" (1.6 m)    Wt 90.8 kg (200 lb 2.8 oz)    SpO2 97%    Breastfeeding No    BMI 35.46 kg/m2     Gen: Well developed  CV: RRR  Lungs: non labored breathing  Abd: soft, non distended  Neuro: A&O x 3, no dysarthria or aphasia  CN II-XII: PERRL, EOMI, face symmetric, tongue/palate midline  Motor: strength 5/5 all four ext  Sensory: intact to LT  DTRs: symmetric  COOR: no limb/truncal ataxia  Gait: normal    PROBLEM LIST:     Patient Active Problem List   Diagnosis Code    Migraine headache G43.909    Seizure disorder (HCC) G40.909    Fasciitis M72.9    HTN (hypertension) I10    Breast cyst N60.09    Abnormal mammogram R92.8    Rosacea L71.9    Routine gynecological examination Z01.419    Carpal tunnel syndrome of left wrist G56.02    B12 deficiency E53.8    Vitamin D deficiency E55.9    Retrograde amnesia R41.2    Acute encephalopathy G93.40    Obesity (BMI 30-39. 9) E66.9    TGA (transient global amnesia) G45.4       Assessment/Plan:      57-year-old woman who I suspect suffered from transient global amnesia which is a TIA equivalent. She will need to continue aspirin 81 mg from here on out. Complete stroke workup to include TTE with bubble study. Depacon to be attempted today for the acute headache. Once her medical studies have been completed I think it is reasonable to send her home. If the headache still persists consider sending her home with a Medrol Dosepak. She is welcome to follow-up with me in the next 4-6 weeks.     During this evaluation, we also discussed stroke education to include signs and symptoms of stroke and TIA.        Signed:  [address] Ryan Sexton, DO  2/1/2018  12:41 PM

## 2018-02-01 NOTE — PROGRESS NOTES
Hospital follow-up PCP transitional care appointment has been scheduled with Dr. Flakito Murphy for Tuesday, 2/6/18 at 4:15 p.m. Pending patient discharge.   Deondre Mckeon, Care Management Specialist.

## 2018-02-01 NOTE — INTERDISCIPLINARY ROUNDS
IDR/SLIDR Summary          Patient: Dewayne Gerber MRN: [id number]    Age: 50 y.o. YOB: 1965 Room/Bed: [facility]   Admit Diagnosis: Acute encephalopathy  Principal Diagnosis: Retrograde amnesia   Goals: discharge  Readmission: NO  Quality Measure: Not applicable  VTE Prophylaxis: Chemical  Influenza Vaccine screening completed? YES  Pneumococcal Vaccine screening completed? NO  Mobility needs: No   Nutrition plan:No  Consults:P.T, O.T. and Case Management    Financial concerns:No  Escalated to CM? NO  RRAT Score: 8   Interventions:H2H  Testing due for pt today?  YES  LOS: 0 days Expected length of stay 1 days  Discharge plan: to home   PCP: Arnoldo Ramirez MD  Transportation needs: No    Days before discharge:one day until discharge   Discharge disposition: Home    Signed:     Jerica Sung  2/1/2018  4:07 AM

## 2018-02-01 NOTE — PROGRESS NOTES
Bedside and Verbal shift change report given to Roslyn Brown (oncoming nurse) by Tank Deng (offgoing nurse). Report included the following information SBAR, Kardex, ED Summary, Procedure Summary, Intake/Output, MAR, Recent Results, Med Rec Status and Cardiac Rhythm NSR.

## 2018-02-02 ENCOUNTER — PATIENT OUTREACH (OUTPATIENT)
Dept: OTHER | Age: 53
End: 2018-02-02

## 2018-02-02 NOTE — PATIENT INSTRUCTIONS
Stroke: Care Instructions  Your Care Instructions    You have had a stroke. This means that the blood flow to a part of your brain was blocked for some time, which damages the nerve cells in that part of the brain. The part of your body controlled by that part of your brain may not function properly now. The brain is an amazing organ that can heal itself to some degree. The stroke you had damaged part of your brain. But other parts of your brain may take over in some way for the damaged areas. You have already started this process. Your doctor will talk with you about what you can do to prevent another stroke. High blood pressure, high cholesterol, and diabetes are all risk factors for stroke. If you have any of these conditions, work with your doctor to make sure they are under control. Other risk factors for stroke include being overweight, smoking, and not getting regular exercise. Going home may be hard for you and your family. The more you can try to do for yourself, the better. Remember to take each day one at a time. Follow-up care is a key part of your treatment and safety. Be sure to make and go to all appointments, and call your doctor if you are having problems. It's also a good idea to know your test results and keep a list of the medicines you take. How can you care for yourself at home? ? · Enter a stroke rehabilitation (rehab) program, if your doctor recommends it. Physical, speech, and occupational therapies can help you manage bathing, dressing, eating, and other basics of daily living. ? · Do not drive until your doctor says it is okay. ? · It is normal to feel sad or depressed after a stroke. If these feelings last, talk to your doctor. ? · If you are having problems with urine leakage, go to the bathroom at regular times, including when you first wake up and at bedtime. Also, limit fluids after dinner.    ? · If you are constipated, drink plenty of fluids, enough so that your urine is light yellow or clear like water. If you have kidney, heart, or liver disease and have to limit fluids, talk with your doctor before you increase the amount of fluids you drink. Set up a regular time for using the toilet. If you continue to have constipation, your doctor may suggest using a bulking agent, such as Metamucil, or a stool softener, laxative, or enema. Medicines  ? · Take your medicines exactly as prescribed. Call your doctor if you think you are having a problem with your medicine. You may be taking several medicines. ACE (angiotensin-converting enzyme) inhibitors, angiotensin II receptor blockers (ARBs), beta-blockers, diuretics (water pills), and calcium channel blockers control your blood pressure. Statins help lower cholesterol. Your doctor may also prescribe medicines for depression, pain, sleep problems, anxiety, or agitation. ? · If your doctor has given you a blood thinner to prevent another stroke, be sure you get instructions about how to take your medicine safely. Blood thinners can cause serious bleeding problems. ? · Do not take any over-the-counter medicines or herbal products without talking to your doctor first.   ? · If you take birth control pills or hormone therapy, talk to your doctor about whether they are right for you. ? For family members and caregivers  ? · Make the home safe. Set up a room so that your loved one does not have to climb stairs. Be sure the bathroom is on the same floor. Move throw rugs and furniture that could cause falls. Make sure that the lighting is good. Put grab bars and seats in tubs and showers. ? · Find out what your loved one can do and what he or she needs help with. Try not to do things for your loved one that your loved one can do on his or her own. Help him or her learn and practice new skills. ? · Visit and talk with your loved one often. Try doing activities together that you both enjoy, such as playing cards or board games. Keep in touch with your loved one's friends as much as you can. Encourage them to visit. ? · Take care of yourself. Do not try to do everything yourself. Ask other family members to help. Eat well, get enough rest, and take time to do things that you enjoy. Keep up with your own doctor visits, and make sure to take your medicines regularly. Get out of the house as much as you can. Join a local support group. Find out if you qualify for home health care visits to help with rehab or for adult day care. When should you call for help? Call 911 anytime you think you may need emergency care. For example, call if:  ? · You have signs of another stroke. These may include:  ¨ Sudden numbness, tingling, weakness, or loss of movement in your face, arm, or leg, especially on only one side of your body. ¨ Sudden vision changes. ¨ Sudden trouble speaking. ¨ Sudden confusion or trouble understanding simple statements. ¨ Sudden problems with walking or balance. ¨ A sudden, severe headache that is different from past headaches. Call 911 even if these symptoms go away in a few minutes. ?Call your doctor now or seek immediate medical care if:  ? · You have new symptoms that may be related to your stroke, such as falls or trouble swallowing. ? Watch closely for changes in your health, and be sure to contact your doctor if you have any problems. Where can you learn more? Go to http://jason-francisco.info/. Enter M334 in the search box to learn more about \"Stroke: Care Instructions. \"  Current as of: March 20, 2017  Content Version: 11.4  © 1091-8865 [employer]. Care instructions adapted under license by [employer] (which disclaims liability or warranty for this information). If you have questions about a medical condition or this instruction, always ask your healthcare professional. [address] any warranty or liability for your use of this information.

## 2018-02-06 ENCOUNTER — OFFICE VISIT (OUTPATIENT)
Dept: INTERNAL MEDICINE CLINIC | Age: 53
End: 2018-02-06

## 2018-02-06 VITALS
WEIGHT: 196 LBS | HEART RATE: 93 BPM | SYSTOLIC BLOOD PRESSURE: 126 MMHG | DIASTOLIC BLOOD PRESSURE: 82 MMHG | HEIGHT: 63 IN | RESPIRATION RATE: 16 BRPM | OXYGEN SATURATION: 96 % | TEMPERATURE: 98.8 F | BODY MASS INDEX: 34.73 KG/M2

## 2018-02-06 DIAGNOSIS — G43.909 MIGRAINE WITHOUT STATUS MIGRAINOSUS, NOT INTRACTABLE, UNSPECIFIED MIGRAINE TYPE: ICD-10-CM

## 2018-02-06 DIAGNOSIS — E78.5 HYPERLIPIDEMIA, UNSPECIFIED HYPERLIPIDEMIA TYPE: ICD-10-CM

## 2018-02-06 DIAGNOSIS — R41.2 RETROGRADE AMNESIA: Primary | ICD-10-CM

## 2018-02-06 DIAGNOSIS — G40.909 SEIZURE DISORDER (HCC): ICD-10-CM

## 2018-02-06 DIAGNOSIS — G45.4 TGA (TRANSIENT GLOBAL AMNESIA): ICD-10-CM

## 2018-02-06 RX ORDER — SUMATRIPTAN 100 MG/1
TABLET, FILM COATED ORAL
Qty: 9 TAB | Refills: 3 | Status: SHIPPED | OUTPATIENT
Start: 2018-02-06 | End: 2018-08-01 | Stop reason: SDUPTHER

## 2018-02-06 RX ORDER — AMITRIPTYLINE HYDROCHLORIDE 10 MG/1
10 TABLET, FILM COATED ORAL
Qty: 30 TAB | Refills: 5 | Status: SHIPPED | OUTPATIENT
Start: 2018-02-06 | End: 2018-08-14 | Stop reason: SDUPTHER

## 2018-02-06 NOTE — PROGRESS NOTES
Rm 12    Chief Complaint   Patient presents with   [facility] Follow Up     1/31/18    Headache     1. Have you been to the ER, urgent care clinic since your last visit? Hospitalized since your last visit? 1/31/18, [facility] [place]    2. Have you seen or consulted any other health care providers outside of the [address] [place] [address] since your last visit? Include any pap smears or colon screening.  No    Health Maintenance Due   Topic Date Due    PAP AKA CERVICAL CYTOLOGY  05/27/1988    BREAST CANCER SCRN MAMMOGRAM  07/21/2013    FOBT Q 1 YEAR AGE 50-75  05/27/2017    Influenza Age 5 to Adult  08/01/2017     Flu vaccine done with employer   PHQ over the last two weeks 12/1/2016   Little interest or pleasure in doing things Not at all   Feeling down, depressed or hopeless Not at all   Total Score PHQ 2 0

## 2018-02-06 NOTE — MR AVS SNAPSHOT
[address] [place] [address] [address] [address] 
[phone] Patient: Jackie Espinal MRN: [id number] :1965 Visit Information Date & Time Provider Department Dept. Phone Encounter #  
 2018  4:15 PM Jason Garcia, [address] [place] [address] and Internal Medicine [phone] [id number] Follow-up Instructions Return in about 2 months (around 2018), or if symptoms worsen or fail to improve, for migraine, cholesterol. Upcoming Health Maintenance Date Due  
 PAP AKA CERVICAL CYTOLOGY 1988 BREAST CANCER SCRN MAMMOGRAM 2013 FOBT Q 1 YEAR AGE 50-75 2017 Influenza Age 5 to Adult 2017 DTaP/Tdap/Td series (2 - Td) 2023 Allergies as of 2018  Review Complete On: 2018 By: Jason Garcia MD  
 No Known Allergies Current Immunizations  Reviewed on 2018 Name Date Influenza Vaccine 10/15/2015 Tdap 2013 Reviewed by Jason Garcia MD on 2018 at  5:31 PM  
You Were Diagnosed With   
  
 Codes Comments Retrograde amnesia    -  Primary ICD-10-CM: R41.2 ICD-9-CM: 780.93 Migraine without status migrainosus, not intractable, unspecified migraine type     ICD-10-CM: G43.909 ICD-9-CM: 346.90 TGA (transient global amnesia)     ICD-10-CM: G45.4 ICD-9-CM: 437.7 Hyperlipidemia, unspecified hyperlipidemia type     ICD-10-CM: E78.5 ICD-9-CM: 272.4 Seizure disorder ([facility] [address]     ICD-10-CM: G40.909 ICD-9-CM: 345.90 Vitals BP Pulse Temp Resp Height(growth percentile) Weight(growth percentile) 126/82 (BP 1 Location: Left arm, BP Patient Position: Sitting) 93 98.8 °F (37.1 °C) (Oral) 16 5' 3\" (1.6 m) 196 lb (88.9 kg) SpO2 BMI OB Status Smoking Status 96% 34.72 kg/m2 Having regular periods Never Smoker Vitals History BMI and BSA Data Body Mass Index Body Surface Area  34.72 kg/m 2 1.99 m 2  
  
  
 Preferred Pharmacy Pharmacy Name Phone [address] [place] [address] [phone] Your Updated Medication List  
  
   
This list is accurate as of: 2/6/18  5:56 PM.  Always use your most recent med list.  
  
  
  
  
 amitriptyline 10 mg tablet Commonly known as:  ELAVIL Take 1 Tab by mouth nightly. aspirin delayed-release 81 mg tablet Take 1 Tab by mouth daily. ibuprofen 800 mg tablet Commonly known as:  MOTRIN Take 1 Tab by mouth every eight (8) hours as needed for Pain. Take with food to minimize stomach discomfort. methylPREDNISolone 4 mg tablet Commonly known as:  [employer] Follow instructions on the pack . SUMAtriptan 100 mg tablet Commonly known as:  IMITREX  
TAKE 1 TABLET BY MOUTH ONCE AS NEEDED MAY REPEAT 1 IN 2 HOURS AS NEEDED Prescriptions Sent to Pharmacy Refills SUMAtriptan (IMITREX) 100 mg tablet 3 Sig: TAKE 1 TABLET BY MOUTH ONCE AS NEEDED MAY REPEAT 1 IN 2 HOURS AS NEEDED Class: Normal  
 Pharmacy: [address] Ph #: [phone]  
 amitriptyline (ELAVIL) 10 mg tablet 5 Sig: Take 1 Tab by mouth nightly. Class: Normal  
 Pharmacy: [address] [place] [address] Ph #: [phone] Route: Oral  
  
Follow-up Instructions Return in about 2 months (around 4/6/2018), or if symptoms worsen or fail to improve, for migraine, cholesterol. Introducing [facility]! Dear Sean Done: 
Thank you for requesting a [employer] account. Our records indicate that you have previously registered for a [employer] account but its currently inactive. Please call our [employer] support line at [phone]. Additional Information If you have questions, please visit the Frequently Asked Questions section of the [employer] website at [email or web address] Remember, MyChart is NOT to be used for urgent needs. For medical emergencies, dial 911. Now available from your iPhone and Android! Please provide this summary of care documentation to your next provider. Your primary care clinician is listed as [address] If you have any questions after today's visit, please call [phone].

## 2018-02-16 ENCOUNTER — PATIENT OUTREACH (OUTPATIENT)
Dept: OTHER | Age: 53
End: 2018-02-16

## 2018-02-22 ENCOUNTER — PATIENT OUTREACH (OUTPATIENT)
Dept: OTHER | Age: 53
End: 2018-02-22

## 2018-02-22 NOTE — PROGRESS NOTES
Follow-up call to pt, two pt identifiers verified. Pt reports still unsure cause of amnesia, continues to take baby aspirin. Prior to admission states that she had some feelings of being on cold medicine for 4 day, states on 4th day was a blur for memory, and  woke pt and pt was unsure of where she was. Was recommended to follow-up with neurologist, this CM offered to assist if patient encounters any barriers to making appt. Previously had neurologist at [address] [place] [address] while pregnant, but was difficult to get appt. Denied any further red flag symptoms or events. This CM will follow-up in 2 weeks.

## 2018-03-12 ENCOUNTER — PATIENT OUTREACH (OUTPATIENT)
Dept: OTHER | Age: 53
End: 2018-03-12

## 2018-03-12 NOTE — PROGRESS NOTES
Resolving current episode Transitions of care complete. No further ED/UC or hospital admissions within 30 days post discharge. Patient attended follow-up appointments as directed. No outreach from patient to [address] [place] [address].

## 2018-06-11 ENCOUNTER — OFFICE VISIT (OUTPATIENT)
Dept: INTERNAL MEDICINE CLINIC | Age: 53
End: 2018-06-11

## 2018-06-11 VITALS
WEIGHT: 197.6 LBS | TEMPERATURE: 97.9 F | DIASTOLIC BLOOD PRESSURE: 100 MMHG | OXYGEN SATURATION: 97 % | SYSTOLIC BLOOD PRESSURE: 140 MMHG | HEIGHT: 63 IN | HEART RATE: 71 BPM | BODY MASS INDEX: 35.01 KG/M2 | RESPIRATION RATE: 18 BRPM

## 2018-06-11 DIAGNOSIS — J40 BRONCHITIS: ICD-10-CM

## 2018-06-11 DIAGNOSIS — I10 ESSENTIAL HYPERTENSION: ICD-10-CM

## 2018-06-11 DIAGNOSIS — J06.9 ACUTE URI: Primary | ICD-10-CM

## 2018-06-11 PROBLEM — E66.01 SEVERE OBESITY (BMI 35.0-39.9): Status: ACTIVE | Noted: 2018-06-11

## 2018-06-11 RX ORDER — AMOXICILLIN 500 MG/1
500 CAPSULE ORAL 3 TIMES DAILY
Qty: 30 CAP | Refills: 0 | Status: SHIPPED | OUTPATIENT
Start: 2018-06-11 | End: 2018-06-21

## 2018-06-11 RX ORDER — LISINOPRIL 10 MG/1
10 TABLET ORAL DAILY
Qty: 30 TAB | Refills: 3 | Status: SHIPPED | OUTPATIENT
Start: 2018-06-11 | End: 2018-08-01 | Stop reason: SINTOL

## 2018-06-11 RX ORDER — METHYLPREDNISOLONE 4 MG/1
TABLET ORAL
Qty: 1 DOSE PACK | Refills: 0 | Status: SHIPPED | OUTPATIENT
Start: 2018-06-11 | End: 2018-08-01 | Stop reason: ALTCHOICE

## 2018-06-11 NOTE — MR AVS SNAPSHOT
[address] [address] [place] [address] 
[phone] Patient: Emili Elizabeth MRN: [id number] :1965 Visit Information Date & Time Provider Department Dept. Phone Encounter #  
 2018 12:00 PM [facility] [phone] [id number] Follow-up Instructions Return in about 1 week (around 2018) for htn, bronchitis. Upcoming Health Maintenance Date Due  
 PAP AKA CERVICAL CYTOLOGY 1988 BREAST CANCER SCRN MAMMOGRAM 2013 FOBT Q 1 YEAR AGE 50-75 2017 Influenza Age 5 to Adult 2018 DTaP/Tdap/Td series (2 - Td) 2023 Allergies as of 2018  Review Complete On: 2018 By: Ajit Concepcion NP No Known Allergies Current Immunizations  Reviewed on 2018 Name Date Influenza Vaccine 10/15/2015 Tdap 2013 Not reviewed this visit You Were Diagnosed With   
  
 Codes Comments Acute URI    -  Primary ICD-10-CM: J06.9 ICD-9-CM: 465.9 Bronchitis     ICD-10-CM: J40 ICD-9-CM: 434 Essential hypertension     ICD-10-CM: I10 
ICD-9-CM: 401.9 Vitals BP Pulse Temp Resp Height(growth percentile) Weight(growth percentile) (!) 140/100 71 97.9 °F (36.6 °C) (Oral) 18 5' 3\" (1.6 m) 197 lb 9.6 oz (89.6 kg) LMP SpO2 BMI OB Status Smoking Status 2018 97% 35 kg/m2 Having regular periods Never Smoker Vitals History BMI and BSA Data Body Mass Index Body Surface Area 35 kg/m 2 2 m 2 Preferred Pharmacy Pharmacy Name Phone [facility] the [facility] [place] [address] [phone] Your Updated Medication List  
  
   
This list is accurate as of 18 12:29 PM.  Always use your most recent med list.  
  
  
  
  
 amitriptyline 10 mg tablet Commonly known as:  ELAVIL Take 1 Tab by mouth nightly. amoxicillin 500 mg capsule Commonly known as:  AMOXIL Take 1 Cap by mouth three (3) times daily for 10 days. aspirin delayed-release 81 mg tablet Take 1 Tab by mouth daily. ibuprofen 800 mg tablet Commonly known as:  MOTRIN Take 1 Tab by mouth every eight (8) hours as needed for Pain. Take with food to minimize stomach discomfort. lisinopril 10 mg tablet Commonly known as:  Franklin Jump Take 1 Tab by mouth daily. methylPREDNISolone 4 mg tablet Commonly known as:  Ethan Hove Use per dose pack SUMAtriptan 100 mg tablet Commonly known as:  IMITREX  
TAKE 1 TABLET BY MOUTH ONCE AS NEEDED MAY REPEAT 1 IN 2 HOURS AS NEEDED Prescriptions Sent to Pharmacy Refills  
 methylPREDNISolone (MEDROL DOSEPACK) 4 mg tablet 0 Sig: Use per dose pack Class: Normal  
 Pharmacy: [address] Ph #: [phone]  
 amoxicillin (AMOXIL) 500 mg capsule 0 Sig: Take 1 Cap by mouth three (3) times daily for 10 days. Class: Normal  
 Pharmacy: [facility] [place] [address] Ph #: [phone] Route: Oral  
 lisinopril (PRINIVIL, ZESTRIL) 10 mg tablet 3 Sig: Take 1 Tab by mouth daily. Class: Normal  
 Pharmacy: [facility] [place] [address] Ph #: [phone] Route: Oral  
  
Follow-up Instructions Return in about 1 week (around 6/18/2018) for htn, bronchitis. Patient Instructions Bronchitis: Care Instructions Your Care Instructions Bronchitis is inflammation of the bronchial tubes, which carry air to the lungs. The tubes swell and produce mucus, or phlegm. The mucus and inflamed bronchial tubes make you cough. You may have trouble breathing. Most cases of bronchitis are caused by viruses like those that cause colds. Antibiotics usually do not help and they may be harmful.  
Bronchitis usually develops rapidly and lasts about 2 to 3 weeks in otherwise healthy people. Follow-up care is a key part of your treatment and safety. Be sure to make and go to all appointments, and call your doctor if you are having problems. It's also a good idea to know your test results and keep a list of the medicines you take. How can you care for yourself at home? · Take all medicines exactly as prescribed. Call your doctor if you think you are having a problem with your medicine. · Get some extra rest. 
· Take an over-the-counter pain medicine, such as acetaminophen (Tylenol), ibuprofen (Advil, Motrin), or naproxen (Aleve) to reduce fever and relieve body aches. Read and follow all instructions on the label. · Do not take two or more pain medicines at the same time unless the doctor told you to. Many pain medicines have acetaminophen, which is Tylenol. Too much acetaminophen (Tylenol) can be harmful. · Take an over-the-counter cough medicine that contains dextromethorphan to help quiet a dry, hacking cough so that you can sleep. Avoid cough medicines that have more than one active ingredient. Read and follow all instructions on the label. · Breathe moist air from a humidifier, hot shower, or sink filled with hot water. The heat and moisture will thin mucus so you can cough it out. · Do not smoke. Smoking can make bronchitis worse. If you need help quitting, talk to your doctor about stop-smoking programs and medicines. These can increase your chances of quitting for good. When should you call for help? Call 911 anytime you think you may need emergency care. For example, call if: 
? · You have severe trouble breathing. ?Call your doctor now or seek immediate medical care if: 
? · You have new or worse trouble breathing. ? · You cough up dark brown or bloody mucus (sputum). ? · You have a new or higher fever. ? · You have a new rash. ? Watch closely for changes in your health, and be sure to contact your doctor if: ? · You cough more deeply or more often, especially if you notice more mucus or a change in the color of your mucus. ? · You are not getting better as expected. Where can you learn more? Go to http://jason-francisco.info/. Enter H333 in the search box to learn more about \"Bronchitis: Care Instructions. \" Current as of: May 12, 2017 Content Version: 11.4 © 8336-7652 [id number]. Care instructions adapted under license by [employer] (which disclaims liability or warranty for this information). If you have questions about a medical condition or this instruction, always ask your healthcare professional. [address] any warranty or liability for your use of this information. Learning About High Blood Pressure What is high blood pressure? Blood pressure is a measure of how hard the blood pushes against the walls of your arteries. It's normal for blood pressure to go up and down throughout the day, but if it stays up, you have high blood pressure. Another name for high blood pressure is hypertension. Two numbers tell you your blood pressure. The first number is the systolic pressure. It shows how hard the blood pushes when your heart is pumping. The second number is the diastolic pressure. It shows how hard the blood pushes between heartbeats, when your heart is relaxed and filling with blood. A blood pressure of less than 120/80 (say \"120 over 80\") is ideal for an adult. High blood pressure is 140/90 or higher. You have high blood pressure if your top number is 140 or higher or your bottom number is 90 or higher, or both. Many people fall into the category in between, called prehypertension. People with prehypertension need to make lifestyle changes to bring their blood pressure down and help prevent or delay high blood pressure. What happens when you have high blood pressure? · Blood flows through your arteries with too much force. Over time, this damages the walls of your arteries. But you can't feel it. High blood pressure usually doesn't cause symptoms. · Fat and calcium start to build up in your arteries. This buildup is called plaque. Plaque makes your arteries narrower and stiffer. Blood can't flow through them as easily. · This lack of good blood flow starts to damage some of the organs in your body. This can lead to problems such as coronary artery disease and heart attack, heart failure, stroke, kidney failure, and eye damage. How can you prevent high blood pressure? · Stay at a healthy weight. · Try to limit how much sodium you eat to less than 2,300 milligrams (mg) a day. If you limit your sodium to 1,500 mg a day, you can lower your blood pressure even more. ¨ Buy foods that are labeled \"unsalted,\" \"sodium-free,\" or \"low-sodium. \" Foods labeled \"reduced-sodium\" and \"light sodium\" may still have too much sodium. ¨ Flavor your food with garlic, lemon juice, onion, vinegar, herbs, and spices instead of salt. Do not use soy sauce, steak sauce, onion salt, garlic salt, mustard, or ketchup on your food. ¨ Use less salt (or none) when recipes call for it. You can often use half the salt a recipe calls for without losing flavor. · Be physically active. Get at least 30 minutes of exercise on most days of the week. Walking is a good choice. You also may want to do other activities, such as running, swimming, cycling, or playing tennis or team sports. · Limit alcohol to 2 drinks a day for men and 1 drink a day for women. · Eat plenty of fruits, vegetables, and low-fat dairy products. Eat less saturated and total fats. How is high blood pressure treated? · Your doctor will suggest making lifestyle changes. For example, your doctor may ask you to eat healthy foods, quit smoking, lose extra weight, and be more active. · If lifestyle changes don't help enough or your blood pressure is very high, you will have to take medicine every day. Follow-up care is a key part of your treatment and safety. Be sure to make and go to all appointments, and call your doctor if you are having problems. It's also a good idea to know your test results and keep a list of the medicines you take. Where can you learn more? Go to http://jason-francisco.info/. Enter P501 in the search box to learn more about \"Learning About High Blood Pressure. \" Current as of: September 21, 2016 Content Version: 11.4 © 7106-6085 [employer]. Care instructions adapted under license by [employer]. (which disclaims liability or warranty for this information). If you have questions about a medical condition or this instruction, always ask your healthcare professional. [address] any warranty or liability for your use of this information. Low Sodium Diet (2,000 Milligram): Care Instructions Your Care Instructions Too much sodium causes your body to hold on to extra water. This can raise your blood pressure and force your heart and kidneys to work harder. In very serious cases, this could cause you to be put in the hospital. It might even be life-threatening. By limiting sodium, you will feel better and lower your risk of serious problems. The most common source of sodium is salt. People get most of the salt in their diet from canned, prepared, and packaged foods. Fast food and restaurant meals also are very high in sodium. Your doctor will probably limit your sodium to less than 2,000 milligrams (mg) a day. This limit counts all the sodium in prepared and packaged foods and any salt you add to your food. Follow-up care is a key part of your treatment and safety.  Be sure to make and go to all appointments, and call your doctor if you are having problems. It's also a good idea to know your test results and keep a list of the medicines you take. How can you care for yourself at home? Read food labels · Read labels on cans and food packages. The labels tell you how much sodium is in each serving. Make sure that you look at the serving size. If you eat more than the serving size, you have eaten more sodium. · Food labels also tell you the Percent Daily Value for sodium. Choose products with low Percent Daily Values for sodium. · Be aware that sodium can come in forms other than salt, including monosodium glutamate (MSG), sodium citrate, and sodium bicarbonate (baking soda). MSG is often added to Asian food. When you eat out, you can sometimes ask for food without MSG or added salt. Buy low-sodium foods · Buy foods that are labeled \"unsalted\" (no salt added), \"sodium-free\" (less than 5 mg of sodium per serving), or \"low-sodium\" (less than 140 mg of sodium per serving). Foods labeled \"reduced-sodium\" and \"light sodium\" may still have too much sodium. Be sure to read the label to see how much sodium you are getting. · Buy fresh vegetables, or frozen vegetables without added sauces. Buy low-sodium versions of canned vegetables, soups, and other canned goods. Prepare low-sodium meals · Cut back on the amount of salt you use in cooking. This will help you adjust to the taste. Do not add salt after cooking. One teaspoon of salt has about 2,300 mg of sodium. · Take the salt shaker off the table. · Flavor your food with garlic, lemon juice, onion, vinegar, herbs, and spices. Do not use soy sauce, lite soy sauce, steak sauce, onion salt, garlic salt, celery salt, mustard, or ketchup on your food. · Use low-sodium salad dressings, sauces, and ketchup. Or make your own salad dressings and sauces without adding salt. · Use less salt (or none) when recipes call for it.  You can often use half the salt a recipe calls for without losing flavor. Other foods such as rice, pasta, and grains do not need added salt. · Rinse canned vegetables, and cook them in fresh water. This removes some-but not all-of the salt. · Avoid water that is naturally high in sodium or that has been treated with water softeners, which add sodium. Call your local water company to find out the sodium content of your water supply. If you buy bottled water, read the label and choose a sodium-free brand. Avoid high-sodium foods · Avoid eating: ¨ Smoked, cured, salted, and canned meat, fish, and poultry. ¨ Ham, padilla, hot dogs, and luncheon meats. ¨ Regular, hard, and processed cheese and regular peanut butter. ¨ Crackers with salted tops, and other salted snack foods such as pretzels, chips, and salted popcorn. ¨ Frozen prepared meals, unless labeled low-sodium. ¨ Canned and dried soups, broths, and bouillon, unless labeled sodium-free or low-sodium. ¨ Canned vegetables, unless labeled sodium-free or low-sodium. ¨ Western Ophelia fries, pizza, tacos, and other fast foods. ¨ Pickles, olives, ketchup, and other condiments, especially soy sauce, unless labeled sodium-free or low-sodium. Where can you learn more? Go to http://jason-francisco.info/. Enter U141 in the search box to learn more about \"Low Sodium Diet (2,000 Milligram): Care Instructions. \" Current as of: May 12, 2017 Content Version: 11.4 © 5638-4522 [employer]. Care instructions adapted under license by [employer] (which disclaims liability or warranty for this information). If you have questions about a medical condition or this instruction, always ask your healthcare professional. [place] [address] any warranty or liability for your use of this information. Introducing [facility]! Dear Theresa Teran: 
Thank you for requesting a [employer] account.   Our records indicate that you have previously registered for a [employer] account but its currently inactive. Please call our [employer] support line at [phone]. Additional Information If you have questions, please visit the Frequently Asked Questions section of the BoatsGo website at [email or web address] Remember, Bo[employer]Go is NOT to be used for urgent needs. For medical emergencies, dial 911. Now available from your iPhone and Android! Please provide this summary of care documentation to your next provider. Your primary care clinician is listed as [address] If you have any questions after today's visit, please call [phone].

## 2018-06-11 NOTE — PATIENT INSTRUCTIONS
Bronchitis: Care Instructions  Your Care Instructions    Bronchitis is inflammation of the bronchial tubes, which carry air to the lungs. The tubes swell and produce mucus, or phlegm. The mucus and inflamed bronchial tubes make you cough. You may have trouble breathing. Most cases of bronchitis are caused by viruses like those that cause colds. Antibiotics usually do not help and they may be harmful. Bronchitis usually develops rapidly and lasts about 2 to 3 weeks in otherwise healthy people. Follow-up care is a key part of your treatment and safety. Be sure to make and go to all appointments, and call your doctor if you are having problems. It's also a good idea to know your test results and keep a list of the medicines you take. How can you care for yourself at home? · Take all medicines exactly as prescribed. Call your doctor if you think you are having a problem with your medicine. · Get some extra rest.  · Take an over-the-counter pain medicine, such as acetaminophen (Tylenol), ibuprofen (Advil, Motrin), or naproxen (Aleve) to reduce fever and relieve body aches. Read and follow all instructions on the label. · Do not take two or more pain medicines at the same time unless the doctor told you to. Many pain medicines have acetaminophen, which is Tylenol. Too much acetaminophen (Tylenol) can be harmful. · Take an over-the-counter cough medicine that contains dextromethorphan to help quiet a dry, hacking cough so that you can sleep. Avoid cough medicines that have more than one active ingredient. Read and follow all instructions on the label. · Breathe moist air from a humidifier, hot shower, or sink filled with hot water. The heat and moisture will thin mucus so you can cough it out. · Do not smoke. Smoking can make bronchitis worse. If you need help quitting, talk to your doctor about stop-smoking programs and medicines. These can increase your chances of quitting for good.   When should you call for help? Call 911 anytime you think you may need emergency care. For example, call if:  ? · You have severe trouble breathing. ?Call your doctor now or seek immediate medical care if:  ? · You have new or worse trouble breathing. ? · You cough up dark brown or bloody mucus (sputum). ? · You have a new or higher fever. ? · You have a new rash. ? Watch closely for changes in your health, and be sure to contact your doctor if:  ? · You cough more deeply or more often, especially if you notice more mucus or a change in the color of your mucus. ? · You are not getting better as expected. Where can you learn more? Go to http://jason-francisco.info/. Enter H333 in the search box to learn more about \"Bronchitis: Care Instructions. \"  Current as of: May 12, 2017  Content Version: 11.4  © 8551-6804 [employer]. Care instructions adapted under license by [employer] (which disclaims liability or warranty for this information). If you have questions about a medical condition or this instruction, always ask your healthcare professional. [place] [address] any warranty or liability for your use of this information. Learning About High Blood Pressure  What is high blood pressure? Blood pressure is a measure of how hard the blood pushes against the walls of your arteries. It's normal for blood pressure to go up and down throughout the day, but if it stays up, you have high blood pressure. Another name for high blood pressure is hypertension. Two numbers tell you your blood pressure. The first number is the systolic pressure. It shows how hard the blood pushes when your heart is pumping. The second number is the diastolic pressure. It shows how hard the blood pushes between heartbeats, when your heart is relaxed and filling with blood. A blood pressure of less than 120/80 (say \"120 over 80\") is ideal for an adult.  High blood pressure is 140/90 or higher. You have high blood pressure if your top number is 140 or higher or your bottom number is 90 or higher, or both. Many people fall into the category in between, called prehypertension. People with prehypertension need to make lifestyle changes to bring their blood pressure down and help prevent or delay high blood pressure. What happens when you have high blood pressure? · Blood flows through your arteries with too much force. Over time, this damages the walls of your arteries. But you can't feel it. High blood pressure usually doesn't cause symptoms. · Fat and calcium start to build up in your arteries. This buildup is called plaque. Plaque makes your arteries narrower and stiffer. Blood can't flow through them as easily. · This lack of good blood flow starts to damage some of the organs in your body. This can lead to problems such as coronary artery disease and heart attack, heart failure, stroke, kidney failure, and eye damage. How can you prevent high blood pressure? · Stay at a healthy weight. · Try to limit how much sodium you eat to less than 2,300 milligrams (mg) a day. If you limit your sodium to 1,500 mg a day, you can lower your blood pressure even more. ¨ Buy foods that are labeled \"unsalted,\" \"sodium-free,\" or \"low-sodium. \" Foods labeled \"reduced-sodium\" and \"light sodium\" may still have too much sodium. ¨ Flavor your food with garlic, lemon juice, onion, vinegar, herbs, and spices instead of salt. Do not use soy sauce, steak sauce, onion salt, garlic salt, mustard, or ketchup on your food. ¨ Use less salt (or none) when recipes call for it. You can often use half the salt a recipe calls for without losing flavor. · Be physically active. Get at least 30 minutes of exercise on most days of the week. Walking is a good choice. You also may want to do other activities, such as running, swimming, cycling, or playing tennis or team sports.   · Limit alcohol to 2 drinks a day for men and 1 drink a day for women. · Eat plenty of fruits, vegetables, and low-fat dairy products. Eat less saturated and total fats. How is high blood pressure treated? · Your doctor will suggest making lifestyle changes. For example, your doctor may ask you to eat healthy foods, quit smoking, lose extra weight, and be more active. · If lifestyle changes don't help enough or your blood pressure is very high, you will have to take medicine every day. Follow-up care is a key part of your treatment and safety. Be sure to make and go to all appointments, and call your doctor if you are having problems. It's also a good idea to know your test results and keep a list of the medicines you take. Where can you learn more? Go to http://jason-francisco.info/. Enter P501 in the search box to learn more about \"Learning About High Blood Pressure. \"  Current as of: September 21, 2016  Content Version: 11.4  © 5811-6667 [employer]. Care instructions adapted under license by [employer] (which disclaims liability or warranty for this information). If you have questions about a medical condition or this instruction, always ask your healthcare professional. [place] [address] any warranty or liability for your use of this information. Low Sodium Diet (2,000 Milligram): Care Instructions  Your Care Instructions    Too much sodium causes your body to hold on to extra water. This can raise your blood pressure and force your heart and kidneys to work harder. In very serious cases, this could cause you to be put in the hospital. It might even be life-threatening. By limiting sodium, you will feel better and lower your risk of serious problems. The most common source of sodium is salt. People get most of the salt in their diet from canned, prepared, and packaged foods. Fast food and restaurant meals also are very high in sodium.  Your doctor will probably limit your sodium to less than 2,000 milligrams (mg) a day. This limit counts all the sodium in prepared and packaged foods and any salt you add to your food. Follow-up care is a key part of your treatment and safety. Be sure to make and go to all appointments, and call your doctor if you are having problems. It's also a good idea to know your test results and keep a list of the medicines you take. How can you care for yourself at home? Read food labels  · Read labels on cans and food packages. The labels tell you how much sodium is in each serving. Make sure that you look at the serving size. If you eat more than the serving size, you have eaten more sodium. · Food labels also tell you the Percent Daily Value for sodium. Choose products with low Percent Daily Values for sodium. · Be aware that sodium can come in forms other than salt, including monosodium glutamate (MSG), sodium citrate, and sodium bicarbonate (baking soda). MSG is often added to Asian food. When you eat out, you can sometimes ask for food without MSG or added salt. Buy low-sodium foods  · Buy foods that are labeled \"unsalted\" (no salt added), \"sodium-free\" (less than 5 mg of sodium per serving), or \"low-sodium\" (less than 140 mg of sodium per serving). Foods labeled \"reduced-sodium\" and \"light sodium\" may still have too much sodium. Be sure to read the label to see how much sodium you are getting. · Buy fresh vegetables, or frozen vegetables without added sauces. Buy low-sodium versions of canned vegetables, soups, and other canned goods. Prepare low-sodium meals  · Cut back on the amount of salt you use in cooking. This will help you adjust to the taste. Do not add salt after cooking. One teaspoon of salt has about 2,300 mg of sodium. · Take the salt shaker off the table. · Flavor your food with garlic, lemon juice, onion, vinegar, herbs, and spices.  Do not use soy sauce, lite soy sauce, steak sauce, onion salt, garlic salt, celery salt, mustard, or ketchup on your food. · Use low-sodium salad dressings, sauces, and ketchup. Or make your own salad dressings and sauces without adding salt. · Use less salt (or none) when recipes call for it. You can often use half the salt a recipe calls for without losing flavor. Other foods such as rice, pasta, and grains do not need added salt. · Rinse canned vegetables, and cook them in fresh water. This removes some-but not all-of the salt. · Avoid water that is naturally high in sodium or that has been treated with water softeners, which add sodium. Call your local water company to find out the sodium content of your water supply. If you buy bottled water, read the label and choose a sodium-free brand. Avoid high-sodium foods  · Avoid eating:  ¨ Smoked, cured, salted, and canned meat, fish, and poultry. ¨ Ham, padilla, hot dogs, and luncheon meats. ¨ Regular, hard, and processed cheese and regular peanut butter. ¨ Crackers with salted tops, and other salted snack foods such as pretzels, chips, and salted popcorn. ¨ Frozen prepared meals, unless labeled low-sodium. ¨ Canned and dried soups, broths, and bouillon, unless labeled sodium-free or low-sodium. ¨ Canned vegetables, unless labeled sodium-free or low-sodium. ¨ [place] fries, pizza, tacos, and other fast foods. ¨ Pickles, olives, ketchup, and other condiments, especially soy sauce, unless labeled sodium-free or low-sodium. Where can you learn more? Go to http://jason-francisco.info/. Enter B142 in the search box to learn more about \"Low Sodium Diet (2,000 Milligram): Care Instructions. \"  Current as of: May 12, 2017  Content Version: 11.4  © 1462-4420 [employer]. Care instructions adapted under license by [employer] (which disclaims liability or warranty for this information).  If you have questions about a medical condition or this instruction, always ask your healthcare professional. [place] [address] any warranty or liability for your use of this information.

## 2018-06-11 NOTE — PROGRESS NOTES
HISTORY OF PRESENT ILLNESS  Jon Cuello is a 48 y.o. female presents for acute care  HPI  Cough for ~ 1 month, sputum discolored    Ears full    No hx of pneumonia or sick contact    Taking Coricidin HBP    No blood pressure medication for sometime    Denies interval change in medical management      Past Medical History:   Diagnosis Date    Breast cyst 8/21/2014    Carpal tunnel syndrome of left wrist 8/21/2014    Fasciitis 12/21/2012    HTN (hypertension)     Migraine headache     Rosacea     Routine gynecological examination     Seizure disorder [facility])        Current Outpatient Prescriptions on File Prior to Visit   Medication Sig Dispense Refill    SUMAtriptan (IMITREX) 100 mg tablet TAKE 1 TABLET BY MOUTH ONCE AS NEEDED MAY REPEAT 1 IN 2 HOURS AS NEEDED 9 Tab 3    amitriptyline (ELAVIL) 10 mg tablet Take 1 Tab by mouth nightly. 30 Tab 5    aspirin delayed-release 81 mg tablet Take 1 Tab by mouth daily. 90 Tab 0    ibuprofen (MOTRIN) 800 mg tablet Take 1 Tab by mouth every eight (8) hours as needed for Pain. Take with food to minimize stomach discomfort. 40 Tab 1     No current facility-administered medications on file prior to visit. Review of Systems   Constitutional: Negative. HENT: Negative. Eyes: Negative. Respiratory: Positive for cough and sputum production. Negative for shortness of breath and wheezing. Cardiovascular: Negative. Negative for chest pain and palpitations. Gastrointestinal: Negative. Genitourinary: Negative. Neurological: Negative for dizziness, focal weakness and headaches. BP (!) 140/100  Pulse 71  Temp 97.9 °F (36.6 °C) (Oral)   Resp 18  Ht 5' 3\" (1.6 m)  Wt 197 lb 9.6 oz (89.6 kg)  LMP 05/20/2018  SpO2 97%  BMI 35 kg/m2  Physical Exam   Constitutional: She is oriented to person, place, and time. She appears well-developed and well-nourished. No distress. HENT:   Right Ear: External ear normal. Tympanic membrane is injected. Left Ear: External ear normal. Tympanic membrane is injected. Nose: Nose normal.   Mouth/Throat: Oropharynx is clear and moist. No oropharyngeal exudate. Eyes: Conjunctivae are normal. Right eye exhibits no discharge. Left eye exhibits no discharge. Cardiovascular: Normal rate and regular rhythm. Pulmonary/Chest: Effort normal and breath sounds normal. No respiratory distress. She has no rales. She exhibits no tenderness. Musculoskeletal: She exhibits no edema, tenderness or deformity. Lymphadenopathy:     She has no cervical adenopathy. Neurological: She is alert and oriented to person, place, and time. No cranial nerve deficit. Skin: Skin is warm and dry. She is not diaphoretic. There is erythema (facial). Psychiatric: She has a normal mood and affect. Her behavior is normal. Judgment and thought content normal.       ASSESSMENT and PLAN    ICD-10-CM ICD-9-CM    1. Acute URI J06.9 465.9 methylPREDNISolone (MEDROL DOSEPACK) 4 mg tablet      amoxicillin (AMOXIL) 500 mg capsule   2. Bronchitis J40 490 methylPREDNISolone (MEDROL DOSEPACK) 4 mg tablet      amoxicillin (AMOXIL) 500 mg capsule   3. Essential hypertension I10 401.9 lisinopril (PRINIVIL, ZESTRIL) 10 mg tablet     Follow-up Disposition:  Return in about 1 week (around 6/18/2018) for htn, bronchitis. reviewed medications and side effects in detail      1,2. Discussed indications for above therapies, encouraged supportive care, use of probiotic during antibiotic therapy    3. Restart medication. Encouraged close monitoring, fasting labs on follow up    Patient voices understanding and acceptance of this advice and will call back if any further questions or concerns. An After Visit Summary was printed and given to the patient.

## 2018-06-11 NOTE — PROGRESS NOTES
Exam Room 7  Gilles Florian is a 48 y.o. female  Chief Complaint   Patient presents with    Cough     x 1 month. Patient thought it was allergies. Not able to sleep at night. coughing up greenish mucous. Hard time hearing. 1. Have you been to the ER, urgent care clinic since your last visit? Hospitalized since your last visit? No    2. Have you seen or consulted any other health care providers outside of the [facility] since your last visit? Include any pap smears or colon screening.  No     Health Maintenance Due   Topic Date Due    PAP AKA CERVICAL CYTOLOGY  05/27/1988    BREAST CANCER SCRN MAMMOGRAM  07/21/2013    FOBT Q 1 YEAR AGE 50-75  05/27/2017

## 2018-06-14 DIAGNOSIS — G45.4 TGA (TRANSIENT GLOBAL AMNESIA): ICD-10-CM

## 2018-06-14 DIAGNOSIS — E53.8 B12 DEFICIENCY: ICD-10-CM

## 2018-06-14 DIAGNOSIS — I10 ESSENTIAL HYPERTENSION: ICD-10-CM

## 2018-06-14 DIAGNOSIS — E55.9 VITAMIN D DEFICIENCY: Primary | ICD-10-CM

## 2018-06-16 LAB
25(OH)D3+25(OH)D2 SERPL-MCNC: 20.2 NG/ML (ref 30–100)
ALBUMIN SERPL-MCNC: 4.1 G/DL (ref 3.5–5.5)
ALBUMIN/GLOB SERPL: 1.4 {RATIO} (ref 1.2–2.2)
ALP SERPL-CCNC: 83 IU/L (ref 39–117)
ALT SERPL-CCNC: 15 IU/L (ref 0–32)
AST SERPL-CCNC: 13 IU/L (ref 0–40)
BASOPHILS # BLD AUTO: 0 X10E3/UL (ref 0–0.2)
BASOPHILS NFR BLD AUTO: 0 %
BILIRUB SERPL-MCNC: 0.2 MG/DL (ref 0–1.2)
BUN SERPL-MCNC: 12 MG/DL (ref 6–24)
BUN/CREAT SERPL: 13 (ref 9–23)
CALCIUM SERPL-MCNC: 9.3 MG/DL (ref 8.7–10.2)
CHLORIDE SERPL-SCNC: 102 MMOL/L (ref 96–106)
CHOLEST SERPL-MCNC: 179 MG/DL (ref 100–199)
CO2 SERPL-SCNC: 23 MMOL/L (ref 20–29)
CREAT SERPL-MCNC: 0.95 MG/DL (ref 0.57–1)
EOSINOPHIL # BLD AUTO: 0 X10E3/UL (ref 0–0.4)
EOSINOPHIL NFR BLD AUTO: 0 %
ERYTHROCYTE [DISTWIDTH] IN BLOOD BY AUTOMATED COUNT: 14.1 % (ref 12.3–15.4)
EST. AVERAGE GLUCOSE BLD GHB EST-MCNC: 105 MG/DL
GLOBULIN SER CALC-MCNC: 2.9 G/DL (ref 1.5–4.5)
GLUCOSE SERPL-MCNC: 83 MG/DL (ref 65–99)
HBA1C MFR BLD: 5.3 % (ref 4.8–5.6)
HCT VFR BLD AUTO: 39.4 % (ref 34–46.6)
HDLC SERPL-MCNC: 55 MG/DL
HGB BLD-MCNC: 12.9 G/DL (ref 11.1–15.9)
IMM GRANULOCYTES # BLD: 0 X10E3/UL (ref 0–0.1)
IMM GRANULOCYTES NFR BLD: 0 %
LDLC SERPL CALC-MCNC: 90 MG/DL (ref 0–99)
LYMPHOCYTES # BLD AUTO: 2.6 X10E3/UL (ref 0.7–3.1)
LYMPHOCYTES NFR BLD AUTO: 27 %
MCH RBC QN AUTO: 28.7 PG (ref 26.6–33)
MCHC RBC AUTO-ENTMCNC: 32.7 G/DL (ref 31.5–35.7)
MCV RBC AUTO: 88 FL (ref 79–97)
MONOCYTES # BLD AUTO: 0.5 X10E3/UL (ref 0.1–0.9)
MONOCYTES NFR BLD AUTO: 6 %
NEUTROPHILS # BLD AUTO: 6.2 X10E3/UL (ref 1.4–7)
NEUTROPHILS NFR BLD AUTO: 67 %
PLATELET # BLD AUTO: 426 X10E3/UL (ref 150–379)
POTASSIUM SERPL-SCNC: 4.5 MMOL/L (ref 3.5–5.2)
PROT SERPL-MCNC: 7 G/DL (ref 6–8.5)
RBC # BLD AUTO: 4.49 X10E6/UL (ref 3.77–5.28)
SODIUM SERPL-SCNC: 139 MMOL/L (ref 134–144)
TRIGL SERPL-MCNC: 170 MG/DL (ref 0–149)
VIT B12 SERPL-MCNC: 374 PG/ML (ref 232–1245)
VLDLC SERPL CALC-MCNC: 34 MG/DL (ref 5–40)
WBC # BLD AUTO: 9.3 X10E3/UL (ref 3.4–10.8)

## 2018-06-20 ENCOUNTER — OFFICE VISIT (OUTPATIENT)
Dept: INTERNAL MEDICINE CLINIC | Age: 53
End: 2018-06-20

## 2018-06-20 VITALS
HEART RATE: 74 BPM | RESPIRATION RATE: 20 BRPM | WEIGHT: 197 LBS | TEMPERATURE: 98.1 F | SYSTOLIC BLOOD PRESSURE: 130 MMHG | BODY MASS INDEX: 34.91 KG/M2 | DIASTOLIC BLOOD PRESSURE: 80 MMHG | OXYGEN SATURATION: 99 % | HEIGHT: 63 IN

## 2018-06-20 DIAGNOSIS — J40 BRONCHITIS: ICD-10-CM

## 2018-06-20 DIAGNOSIS — I10 ESSENTIAL HYPERTENSION: Primary | ICD-10-CM

## 2018-06-20 NOTE — PROGRESS NOTES
Chief Complaint   Patient presents with    Follow-up     1 week f/u     1. Have you been to the ER, urgent care clinic since your last visit? Hospitalized since your last visit? No    2. Have you seen or consulted any other health care providers outside of the [address] [place] [address] since your last visit? Include any pap smears or colon screening.  No

## 2018-06-20 NOTE — PATIENT INSTRUCTIONS
Learning About High Blood Pressure  What is high blood pressure? Blood pressure is a measure of how hard the blood pushes against the walls of your arteries. It's normal for blood pressure to go up and down throughout the day, but if it stays up, you have high blood pressure. Another name for high blood pressure is hypertension. Two numbers tell you your blood pressure. The first number is the systolic pressure. It shows how hard the blood pushes when your heart is pumping. The second number is the diastolic pressure. It shows how hard the blood pushes between heartbeats, when your heart is relaxed and filling with blood. A blood pressure of less than 120/80 (say \"120 over 80\") is ideal for an adult. High blood pressure is 140/90 or higher. You have high blood pressure if your top number is 140 or higher or your bottom number is 90 or higher, or both. Many people fall into the category in between, called prehypertension. People with prehypertension need to make lifestyle changes to bring their blood pressure down and help prevent or delay high blood pressure. What happens when you have high blood pressure? · Blood flows through your arteries with too much force. Over time, this damages the walls of your arteries. But you can't feel it. High blood pressure usually doesn't cause symptoms. · Fat and calcium start to build up in your arteries. This buildup is called plaque. Plaque makes your arteries narrower and stiffer. Blood can't flow through them as easily. · This lack of good blood flow starts to damage some of the organs in your body. This can lead to problems such as coronary artery disease and heart attack, heart failure, stroke, kidney failure, and eye damage. How can you prevent high blood pressure? · Stay at a healthy weight. · Try to limit how much sodium you eat to less than 2,300 milligrams (mg) a day.  If you limit your sodium to 1,500 mg a day, you can lower your blood pressure even more.  ¨ Buy foods that are labeled \"unsalted,\" \"sodium-free,\" or \"low-sodium. \" Foods labeled \"reduced-sodium\" and \"light sodium\" may still have too much sodium. ¨ Flavor your food with garlic, lemon juice, onion, vinegar, herbs, and spices instead of salt. Do not use soy sauce, steak sauce, onion salt, garlic salt, mustard, or ketchup on your food. ¨ Use less salt (or none) when recipes call for it. You can often use half the salt a recipe calls for without losing flavor. · Be physically active. Get at least 30 minutes of exercise on most days of the week. Walking is a good choice. You also may want to do other activities, such as running, swimming, cycling, or playing tennis or team sports. · Limit alcohol to 2 drinks a day for men and 1 drink a day for women. · Eat plenty of fruits, vegetables, and low-fat dairy products. Eat less saturated and total fats. How is high blood pressure treated? · Your doctor will suggest making lifestyle changes. For example, your doctor may ask you to eat healthy foods, quit smoking, lose extra weight, and be more active. · If lifestyle changes don't help enough or your blood pressure is very high, you will have to take medicine every day. Follow-up care is a key part of your treatment and safety. Be sure to make and go to all appointments, and call your doctor if you are having problems. It's also a good idea to know your test results and keep a list of the medicines you take. Where can you learn more? Go to http://jason-francisco.info/. Enter P501 in the search box to learn more about \"Learning About High Blood Pressure. \"  Current as of: September 21, 2016  Content Version: 11.4  © 1275-2139 [employer]. Care instructions adapted under license by [employer] (which disclaims liability or warranty for this information).  If you have questions about a medical condition or this instruction, always ask your healthcare professional. [employer] disclaims any warranty or liability for your use of this information.

## 2018-06-20 NOTE — MR AVS SNAPSHOT
[address] [place] [address] [address] [place] [address] 
[phone] Patient: Guille Swift MRN: [id number] :1965 Visit Information Date & Time Provider Department Dept. Phone Encounter #  
 2018  3:45 PM Singh Ibarra and Internal Medicine [phone] [id number] Follow-up Instructions Return in about 6 months (around 2018), or if symptoms worsen or fail to improve, for htn. Upcoming Health Maintenance Date Due  
 PAP AKA CERVICAL CYTOLOGY 1988 BREAST CANCER SCRN MAMMOGRAM 2013 FOBT Q 1 YEAR AGE 50-75 2017 Influenza Age 5 to Adult 2018 DTaP/Tdap/Td series (2 - Td) 2023 Allergies as of 2018  Review Complete On: 2018 By: John Ambrose LPN No Known Allergies Current Immunizations  Reviewed on 2018 Name Date Influenza Vaccine 10/15/2015 Tdap 2013 Not reviewed this visit You Were Diagnosed With   
  
 Codes Comments Essential hypertension    -  Primary ICD-10-CM: I10 
ICD-9-CM: 401.9 Bronchitis     ICD-10-CM: J40 ICD-9-CM: 715 Vitals BP Pulse Temp Resp Height(growth percentile) Weight(growth percentile) 130/87 74 98.1 °F (36.7 °C) (Oral) 20 5' 3\" (1.6 m) 197 lb (89.4 kg) LMP SpO2 BMI OB Status Smoking Status 2018 99% 34.9 kg/m2 Having regular periods Never Smoker BMI and BSA Data Body Mass Index Body Surface Area 34.9 kg/m 2 1.99 m 2 Preferred Pharmacy Pharmacy Name Phone [facility] [address] [phone] Your Updated Medication List  
  
   
This list is accurate as of 18  3:54 PM.  Always use your most recent med list.  
  
  
  
  
 amitriptyline 10 mg tablet Commonly known as:  ELAVIL Take 1 Tab by mouth nightly. amoxicillin 500 mg capsule Commonly known as:  AMOXIL Take 1 Cap by mouth three (3) times daily for 10 days. aspirin delayed-release 81 mg tablet Take 1 Tab by mouth daily. ibuprofen 800 mg tablet Commonly known as:  MOTRIN Take 1 Tab by mouth every eight (8) hours as needed for Pain. Take with food to minimize stomach discomfort. lisinopril 10 mg tablet Commonly known as:  Kristine Husbands Take 1 Tab by mouth daily. methylPREDNISolone 4 mg tablet Commonly known as:  Valdemar Ramos Use per dose pack SUMAtriptan 100 mg tablet Commonly known as:  IMITREX  
TAKE 1 TABLET BY MOUTH ONCE AS NEEDED MAY REPEAT 1 IN 2 HOURS AS NEEDED Follow-up Instructions Return in about 6 months (around 12/20/2018), or if symptoms worsen or fail to improve, for htn. Patient Instructions Learning About High Blood Pressure What is high blood pressure? Blood pressure is a measure of how hard the blood pushes against the walls of your arteries. It's normal for blood pressure to go up and down throughout the day, but if it stays up, you have high blood pressure. Another name for high blood pressure is hypertension. Two numbers tell you your blood pressure. The first number is the systolic pressure. It shows how hard the blood pushes when your heart is pumping. The second number is the diastolic pressure. It shows how hard the blood pushes between heartbeats, when your heart is relaxed and filling with blood. A blood pressure of less than 120/80 (say \"120 over 80\") is ideal for an adult. High blood pressure is 140/90 or higher. You have high blood pressure if your top number is 140 or higher or your bottom number is 90 or higher, or both. Many people fall into the category in between, called prehypertension. People with prehypertension need to make lifestyle changes to bring their blood pressure down and help prevent or delay high blood pressure. What happens when you have high blood pressure? · Blood flows through your arteries with too much force. Over time, this damages the walls of your arteries. But you can't feel it. High blood pressure usually doesn't cause symptoms. · Fat and calcium start to build up in your arteries. This buildup is called plaque. Plaque makes your arteries narrower and stiffer. Blood can't flow through them as easily. · This lack of good blood flow starts to damage some of the organs in your body. This can lead to problems such as coronary artery disease and heart attack, heart failure, stroke, kidney failure, and eye damage. How can you prevent high blood pressure? · Stay at a healthy weight. · Try to limit how much sodium you eat to less than 2,300 milligrams (mg) a day. If you limit your sodium to 1,500 mg a day, you can lower your blood pressure even more. ¨ Buy foods that are labeled \"unsalted,\" \"sodium-free,\" or \"low-sodium. \" Foods labeled \"reduced-sodium\" and \"light sodium\" may still have too much sodium. ¨ Flavor your food with garlic, lemon juice, onion, vinegar, herbs, and spices instead of salt. Do not use soy sauce, steak sauce, onion salt, garlic salt, mustard, or ketchup on your food. ¨ Use less salt (or none) when recipes call for it. You can often use half the salt a recipe calls for without losing flavor. · Be physically active. Get at least 30 minutes of exercise on most days of the week. Walking is a good choice. You also may want to do other activities, such as running, swimming, cycling, or playing tennis or team sports. · Limit alcohol to 2 drinks a day for men and 1 drink a day for women. · Eat plenty of fruits, vegetables, and low-fat dairy products. Eat less saturated and total fats. How is high blood pressure treated? · Your doctor will suggest making lifestyle changes. For example, your doctor may ask you to eat healthy foods, quit smoking, lose extra weight, and be more active. · If lifestyle changes don't help enough or your blood pressure is very high, you will have to take medicine every day. Follow-up care is a key part of your treatment and safety. Be sure to make and go to all appointments, and call your doctor if you are having problems. It's also a good idea to know your test results and keep a list of the medicines you take. Where can you learn more? Go to http://jason-francisco.info/. Enter P501 in the search box to learn more about \"Learning About High Blood Pressure. \" Current as of: September 21, 2016 Content Version: 11.4 © 6483-4598 [employer]. Care instructions adapted under license by [employer] (which disclaims liability or warranty for this information). If you have questions about a medical condition or this instruction, always ask your healthcare professional. [address] any warranty or liability for your use of this information. Introducing [facility] & HEALTH SERVICES! [employer] introduces [employer] patient portal. Now you can access parts of your medical record, email your doctor's office, and request medication refills online. 1. In your internet browser, go to [email or web address] It [email or web address] 2. Click on the First Time User? Click Here link in the Sign In box. You will see the New Member Sign Up page. 3. Enter your [employer] Access Code exactly as it appears below. You will not need to use this code after youve completed the sign-up process. If you do not sign up before the expiration date, you must request a new code. · [employer] Access Code: [email or web address] Expires: 9/18/2018  3:37 PM 
 
4. Enter the last four digits of your Social Security Number (xxxx) and Date of Birth (mm/dd/yyyy) as indicated and click Submit. You will be taken to the next sign-up page. 5. Create a [employer] ID.  This will be your [employer] login ID and cannot be changed, so think of one that is secure and easy to remember. 6. Create a [employer] password. You can change your password at any time. 7. Enter your Password Reset Question and Answer. This can be used at a later time if you forget your password. 8. Enter your e-mail address. You will receive e-mail notification when new information is available in [address] 9. Click Sign Up. You can now view and download portions of your medical record. 10. Click the Download Summary menu link to download a portable copy of your medical information. If you have questions, please visit the Frequently Asked Questions section of the [employer] website. Remember, [employer] is NOT to be used for urgent needs. For medical emergencies, dial 911. Now available from your iPhone and Android! Please provide this summary of care documentation to your next provider. Your primary care clinician is listed as [address] If you have any questions after today's visit, please call [phone].

## 2018-06-20 NOTE — PROGRESS NOTES
HISTORY OF PRESENT ILLNESS  Caryn Gore is a 48 y.o. female with history of hypertension and acute bronchitis presents for short interval follow up  HPI     She feels fine  Cough is significantly better and sputum is now clear. No longer coughs when she eats    Restarted blood pressure medication since LOV. Denies ADRs    Past Medical History:   Diagnosis Date    Breast cyst 8/21/2014    Carpal tunnel syndrome of left wrist 8/21/2014    Fasciitis 12/21/2012    HTN (hypertension)     Migraine headache     Rosacea     Routine gynecological examination     Seizure disorder [facility])        Current Outpatient Prescriptions on File Prior to Visit   Medication Sig Dispense Refill    amoxicillin (AMOXIL) 500 mg capsule Take 1 Cap by mouth three (3) times daily for 10 days. 30 Cap 0    lisinopril (PRINIVIL, ZESTRIL) 10 mg tablet Take 1 Tab by mouth daily. 30 Tab 3    SUMAtriptan (IMITREX) 100 mg tablet TAKE 1 TABLET BY MOUTH ONCE AS NEEDED MAY REPEAT 1 IN 2 HOURS AS NEEDED 9 Tab 3    amitriptyline (ELAVIL) 10 mg tablet Take 1 Tab by mouth nightly. 30 Tab 5    aspirin delayed-release 81 mg tablet Take 1 Tab by mouth daily. 90 Tab 0    ibuprofen (MOTRIN) 800 mg tablet Take 1 Tab by mouth every eight (8) hours as needed for Pain. Take with food to minimize stomach discomfort. 40 Tab 1    methylPREDNISolone (MEDROL DOSEPACK) 4 mg tablet Use per dose pack 1 Dose Pack 0     No current facility-administered medications on file prior to visit. Review of Systems   Constitutional: Negative for chills and fever. HENT: Negative. Eyes: Negative. Respiratory: Positive for cough and sputum production. Negative for hemoptysis, shortness of breath and wheezing. Cardiovascular: Negative. Gastrointestinal: Negative. Genitourinary: Negative. Neurological: Negative for dizziness and headaches.      /80  Pulse 74  Temp 98.1 °F (36.7 °C) (Oral)   Resp 20  Ht 5' 3\" (1.6 m)  Wt 197 lb (89.4 kg)  LMP 05/29/2018  SpO2 99%  BMI 34.9 kg/m2  Physical Exam   Constitutional: She appears well-developed and well-nourished. No distress. Cardiovascular: Normal rate, regular rhythm and normal heart sounds. Pulmonary/Chest: Effort normal and breath sounds normal.   Skin: She is not diaphoretic. ASSESSMENT and PLAN    ICD-10-CM ICD-9-CM    1. Essential hypertension I10 401.9    2. Bronchitis J40 490      Follow-up Disposition:  Return in about 6 months (around 12/20/2018), or if symptoms worsen or fail to improve, for htn. current treatment plan is effective, no change in therapy  lab results and schedule of future lab studies reviewed with patient  reviewed diet, exercise and weight control    1. Controlled. Continue current medications    2. Resolving. Completed antibiotic, increase hydration    Patient voices understanding and acceptance of this advice and will call back if any further questions or concerns. An After Visit Summary was printed and given to the patient.

## 2018-08-01 ENCOUNTER — OFFICE VISIT (OUTPATIENT)
Dept: INTERNAL MEDICINE CLINIC | Age: 53
End: 2018-08-01

## 2018-08-01 VITALS
SYSTOLIC BLOOD PRESSURE: 129 MMHG | WEIGHT: 197.2 LBS | HEART RATE: 86 BPM | DIASTOLIC BLOOD PRESSURE: 83 MMHG | OXYGEN SATURATION: 96 % | HEIGHT: 63 IN | RESPIRATION RATE: 17 BRPM | BODY MASS INDEX: 34.94 KG/M2

## 2018-08-01 DIAGNOSIS — R49.0 HOARSENESS OF VOICE: ICD-10-CM

## 2018-08-01 DIAGNOSIS — R05.9 COUGH: Primary | ICD-10-CM

## 2018-08-01 DIAGNOSIS — M79.674 TOE PAIN, RIGHT: ICD-10-CM

## 2018-08-01 DIAGNOSIS — I10 ESSENTIAL HYPERTENSION: ICD-10-CM

## 2018-08-01 DIAGNOSIS — G43.909 MIGRAINE WITHOUT STATUS MIGRAINOSUS, NOT INTRACTABLE, UNSPECIFIED MIGRAINE TYPE: ICD-10-CM

## 2018-08-01 RX ORDER — LOSARTAN POTASSIUM 25 MG/1
25 TABLET ORAL DAILY
Qty: 30 TAB | Refills: 5 | Status: SHIPPED | OUTPATIENT
Start: 2018-08-01 | End: 2018-10-30 | Stop reason: SDUPTHER

## 2018-08-01 RX ORDER — AZITHROMYCIN 250 MG/1
TABLET, FILM COATED ORAL
Qty: 6 TAB | Refills: 0 | Status: SHIPPED | OUTPATIENT
Start: 2018-08-01 | End: 2018-08-06

## 2018-08-01 RX ORDER — SUMATRIPTAN 100 MG/1
TABLET, FILM COATED ORAL
Qty: 9 TAB | Refills: 5 | Status: SHIPPED | OUTPATIENT
Start: 2018-08-01

## 2018-08-01 NOTE — PROGRESS NOTES
HPI:  Presents for f/u cough, hoarseness, toe injury    Recurrent cough  June illness improved with Rx'd tx    Then, recurrent coughing spells have evolved  Started as a little \"tickle\" but coughed for 10 minutes least night  Dry cough, minimal sputum  No PND    No SOB    Recurrent hoarseness  Any significant talking causes hoarseness  Denies GERD sx    Has resumed lisinopril since 6/11/18 - does not recall any cough with lisinopril previously    Remote hx toe injury  Recent increase in toe pain  Past medical, Social, and Family history reviewed    Prior to Admission medications    Medication Sig Start Date End Date Taking? Authorizing Provider   lisinopril (PRINIVIL, ZESTRIL) 10 mg tablet Take 1 Tab by mouth daily. 6/11/18  Yes Khoi Brower NP   SUMAtriptan (IMITREX) 100 mg tablet TAKE 1 TABLET BY MOUTH ONCE AS NEEDED MAY REPEAT 1 IN 2 HOURS AS NEEDED 2/6/18  Yes Nayeli Tovar MD   amitriptyline (ELAVIL) 10 mg tablet Take 1 Tab by mouth nightly. 2/6/18  Yes Nayeli Tovar MD   aspirin delayed-release 81 mg tablet Take 1 Tab by mouth daily. 2/2/18  Yes Dat Bell MD   ibuprofen (MOTRIN) 800 mg tablet Take 1 Tab by mouth every eight (8) hours as needed for Pain. Take with food to minimize stomach discomfort. 10/31/17  Yes Brie Farr MD          ROS  Complete ROS reviewed and negative or stable except as noted in HPI. Physical Exam   Constitutional: She is oriented to person, place, and time. She appears well-nourished. No distress. HENT:   Head: Normocephalic and atraumatic. Mouth/Throat: Oropharynx is clear and moist. No oropharyngeal exudate. Eyes: EOM are normal. Pupils are equal, round, and reactive to light. No scleral icterus. Neck: Normal range of motion. Neck supple. No JVD present. No thyromegaly present. Cardiovascular: Normal rate, regular rhythm and normal heart sounds. Exam reveals no gallop and no friction rub. No murmur heard.   Pulmonary/Chest: Effort normal and breath sounds normal. No respiratory distress. She has no wheezes. She has no rales. Abdominal: Soft. Bowel sounds are normal. She exhibits no distension. There is no tenderness. Musculoskeletal: Normal range of motion. She exhibits no edema. Lymphadenopathy:     She has no cervical adenopathy. Neurological: She is alert and oriented to person, place, and time. She exhibits normal muscle tone. Coordination normal.   Skin: Skin is warm. No rash noted. Thickened right 5th digit nail and surrounding erythema   Psychiatric: She has a normal mood and affect. Nursing note and vitals reviewed. Prior labs reviewed. Assessment/Plan:    ICD-10-CM ICD-9-CM    1. Cough R05 786.2 azithromycin (ZITHROMAX) 250 mg tablet   2. Migraine without status migrainosus, not intractable, unspecified migraine type G43.909 346.90 SUMAtriptan (IMITREX) 100 mg tablet   3. Hoarseness of voice R49.0 784.42 REFERRAL TO ENT-OTOLARYNGOLOGY   4. Essential hypertension I10 401.9 losartan (COZAAR) 25 mg tablet   5. Toe pain, right M79.674 729.5 XR FOOT RT MIN 3 V      REFERRAL TO PODIATRY     Follow-up Disposition:  Return in about 3 weeks (around 8/22/2018), or if symptoms worsen or fail to improve, for blood pressure, wellness visit.   results and schedule of future studies reviewed with patient  reviewed diet, exercise and weight   cardiovascular risk and specific lipid/LDL goals reviewed  reviewed medications and side effects in detail   azithro  Change to losartan at 25 mg  Ref to ENT if hoarseness persists  Consider PPI  Xray right foot  Ref to podiatry for nail care

## 2018-08-01 NOTE — MR AVS SNAPSHOT
[address] [place] [address] [address] [place] S[place] [address] 
[phone] Patient: Ailyn Metzger MRN: [id number] :1965 Visit Information Date & Time Provider Department Dept. Phone Encounter #  
 2018  4:15 PM Kristofer George, [address] [place] [address] and Internal Medicine [phone] [id number] Follow-up Instructions Return in about 3 weeks (around 2018), or if symptoms worsen or fail to improve, for blood pressure, wellness visit. Upcoming Health Maintenance Date Due  
 PAP AKA CERVICAL CYTOLOGY 1988 BREAST CANCER SCRN MAMMOGRAM 2013 FOBT Q 1 YEAR AGE 50-75 2017 Influenza Age 5 to Adult 2018 DTaP/Tdap/Td series (2 - Td) 2023 Allergies as of 2018  Review Complete On: 2018 By: Kristofer George MD  
 No Known Allergies Current Immunizations  Reviewed on 2018 Name Date Influenza Vaccine 10/15/2015 Tdap 2013 Reviewed by Kristofer George MD on 2018 at  5:22 PM  
You Were Diagnosed With   
  
 Codes Comments Cough    -  Primary ICD-10-CM: Q27 ICD-9-CM: 786.2 Migraine without status migrainosus, not intractable, unspecified migraine type     ICD-10-CM: G43.909 ICD-9-CM: 346.90 Hoarseness of voice     ICD-10-CM: R49.0 ICD-9-CM: 784.42 Essential hypertension     ICD-10-CM: I10 
ICD-9-CM: 401.9 Toe pain, right     ICD-10-CM: B53.864 ICD-9-CM: 729.5 Vitals BP Pulse Temp Resp Height(growth percentile) Weight(growth percentile) 129/83 (BP 1 Location: Left arm, BP Patient Position: Sitting) 86 (P) 98.4 °F (36.9 °C) (Oral) 17 5' 3\" (1.6 m) 197 lb 3.2 oz (89.4 kg) SpO2 BMI OB Status Smoking Status 96% 34.93 kg/m2 Having regular periods Never Smoker Vitals History BMI and BSA Data Body Mass Index Body Surface Area 34.93 kg/m 2 1.99 m 2 Preferred Pharmacy Pharmacy Name Phone [address] [phone] Your Updated Medication List  
  
   
This list is accurate as of 8/1/18  5:44 PM.  Always use your most recent med list.  
  
  
  
  
 amitriptyline 10 mg tablet Commonly known as:  ELAVIL Take 1 Tab by mouth nightly. aspirin delayed-release 81 mg tablet Take 1 Tab by mouth daily. azithromycin 250 mg tablet Commonly known as:  Roblero Lauth Take 2 tablets today, then take 1 tablet daily  
  
 ibuprofen 800 mg tablet Commonly known as:  MOTRIN Take 1 Tab by mouth every eight (8) hours as needed for Pain. Take with food to minimize stomach discomfort. losartan 25 mg tablet Commonly known as:  COZAAR Take 1 Tab by mouth daily. SUMAtriptan 100 mg tablet Commonly known as:  IMITREX  
TAKE 1 TABLET BY MOUTH ONCE AS NEEDED MAY REPEAT 1 IN 2 HOURS AS NEEDED Prescriptions Sent to Pharmacy Refills SUMAtriptan (IMITREX) 100 mg tablet 5 Sig: TAKE 1 TABLET BY MOUTH ONCE AS NEEDED MAY REPEAT 1 IN 2 HOURS AS NEEDED Class: Normal  
 Pharmacy: [address] Ph #: [phone]  
 losartan (COZAAR) 25 mg tablet 5 Sig: Take 1 Tab by mouth daily. Class: Normal  
 Pharmacy: [address] [place] [address] Ph #: [phone] Route: Oral  
 azithromycin (ZITHROMAX) 250 mg tablet 0 Sig: Take 2 tablets today, then take 1 tablet daily Class: Normal  
 Pharmacy: [address] Ph #: [phone] We Performed the Following REFERRAL TO ENT-OTOLARYNGOLOGY [ZZY80 Custom] REFERRAL TO PODIATRY [REF90 Custom] Follow-up Instructions Return in about 3 weeks (around 8/22/2018), or if symptoms worsen or fail to improve, for blood pressure, wellness visit. To-Do List   
 08/02/2018 Imaging:  XR FOOT RT MIN 3 V Referral Information Referral ID Referred By Referred To  
  
 [id number] BOB, 305 [facility]   
   [address] [place], [address] Visits Status Start Date End Date 1 New Request 8/1/18 8/1/19 If your referral has a status of pending review or denied, additional information will be sent to support the outcome of this decision. Referral ID Referred By Referred To  
 [id number] BOB, 200 [facility] Bill Fang [address] Fax: [phone] Visits Status Start Date End Date 1 New Request 8/1/18 8/1/19 If your referral has a status of pending review or denied, additional information will be sent to support the outcome of this decision. Introducing \[facility]\"" & HEALTH SERVICES! [facility] introduces [employer] patient portal. Now you can access parts of your medical record, email your doctor's office, and request medication refills online. 1. In your internet browser, go to [email or web address] 2. Click on the First Time User? Click Here link in the Sign In box. You will see the New Member Sign Up page. 3. Enter your [employer] Access Code exactly as it appears below. You will not need to use this code after youve completed the sign-up process. If you do not sign up before the expiration date, you must request a new code. · [employer] Access Code: [email or web address] Expires: 9/18/2018  3:37 PM 
 
4. Enter the last four digits of your Social Security Number (xxxx) and Date of Birth (mm/dd/yyyy) as indicated and click Submit. You will be taken to the next sign-up page. 5. Create a Cleartript ID. This will be your [employer] login ID and cannot be changed, so think of one that is secure and easy to remember. 6. Create a Cleartript password. You can change your password at any time. 7. Enter your Password Reset Question and Answer. This can be used at a later time if you forget your password. 8. Enter your e-mail address. You will receive e-mail notification when new information is available in [address] 9. Click Sign Up. You can now view and download portions of your medical record. 10. Click the Download Summary menu link to download a portable copy of your medical information. If you have questions, please visit the Frequently Asked Questions section of the [employer] website. Remember, [employer] is NOT to be used for urgent needs. For medical emergencies, dial 911. Now available from your iPhone and Android! Please provide this summary of care documentation to your next provider. Your primary care clinician is listed as [address] If you have any questions after today's visit, please call [phone].

## 2018-08-01 NOTE — PROGRESS NOTES
Exam Room #13  Jose Nair is a 48 y.o. female  Chief Complaint   Patient presents with    Cough     reports having a reoccurent cough while constantly losing her voice; cough is nonproductive. Was a passive smoker during childhood due to father smoking around her.  Toe Injury     reports that she feels pain when wearing certain shoes or applying pressure to her right pinky toe and doesn't know why     1. Have you been to the ER, urgent care clinic since your last visit? Hospitalized since your last visit? No    2. Have you seen or consulted any other health care providers outside of the [address] [place] [address] since your last visit? Include any pap smears or colon screening.  No    Visit Vitals    /83 (BP 1 Location: Left arm, BP Patient Position: Sitting)    Pulse 86    Temp (P) 98.4 °F (36.9 °C) (Oral)    Resp 17    Ht 5' 3\" (1.6 m)    Wt 197 lb 3.2 oz (89.4 kg)    SpO2 96%    BMI 34.93 kg/m2

## 2018-08-14 DIAGNOSIS — G43.909 MIGRAINE WITHOUT STATUS MIGRAINOSUS, NOT INTRACTABLE, UNSPECIFIED MIGRAINE TYPE: ICD-10-CM

## 2018-08-14 RX ORDER — AMITRIPTYLINE HYDROCHLORIDE 10 MG/1
TABLET, FILM COATED ORAL
Qty: 30 TAB | Refills: 5 | Status: SHIPPED | OUTPATIENT
Start: 2018-08-14 | End: 2018-10-30 | Stop reason: SDUPTHER

## 2018-10-23 DIAGNOSIS — I10 ESSENTIAL HYPERTENSION: ICD-10-CM

## 2018-10-23 NOTE — TELEPHONE ENCOUNTER
Medication refill request:    Last Office Visit: August 01, 2018   Next Office Visit:  No future appointments. Pharmacy verified. yes  Patient requesting 90 day supply.

## 2018-10-24 NOTE — TELEPHONE ENCOUNTER
Attempted calling pt on both home and cell phone numbers. Busy signal on home and mailbox full on cell. Calling to clarify if pt is taking this medication?

## 2018-10-26 RX ORDER — LISINOPRIL 10 MG/1
10 TABLET ORAL DAILY
Qty: 30 TAB | Refills: 3 | OUTPATIENT
Start: 2018-10-26

## 2018-10-30 DIAGNOSIS — G43.909 MIGRAINE WITHOUT STATUS MIGRAINOSUS, NOT INTRACTABLE, UNSPECIFIED MIGRAINE TYPE: ICD-10-CM

## 2018-10-30 DIAGNOSIS — I10 ESSENTIAL HYPERTENSION: ICD-10-CM

## 2018-10-30 NOTE — TELEPHONE ENCOUNTER
Medication refill request:    Last Office Visit:8/1/18  Next Office Visit:  No future appointments. Pharmacy verified. Yes    [facility] is requesting a 90 day supply.

## 2018-10-31 RX ORDER — AMITRIPTYLINE HYDROCHLORIDE 10 MG/1
TABLET, FILM COATED ORAL
Qty: 90 TAB | Refills: 1 | Status: SHIPPED | OUTPATIENT
Start: 2018-10-31 | End: 2019-06-14 | Stop reason: SDUPTHER

## 2018-10-31 RX ORDER — LOSARTAN POTASSIUM 25 MG/1
25 TABLET ORAL DAILY
Qty: 90 TAB | Refills: 1 | Status: SHIPPED | OUTPATIENT
Start: 2018-10-31 | End: 2019-06-18 | Stop reason: SDUPTHER

## 2019-04-22 NOTE — PROGRESS NOTES
Transition Of Care Note    Patient discharged from [address] [place] [address] for observation on  for retrograde amnesia. Medical History:     Past Medical History:   Diagnosis Date    Breast cyst 2014    Carpal tunnel syndrome of left wrist 2014    Fasciitis 2012    HTN (hypertension)     Migraine headache     Rosacea     Routine gynecological examination     Seizure disorder [facility])        Care Manager contacted the patient by telephone to perform post observation discharge assessment. Verified  and zip code with patient as identifiers. Provided introduction to self, and explanation of the Nurse Care Manager role. Medication:   Performed medication reconciliation with patient, and patient verbalizes understanding of administration of home medications. Patient has not yet picked up medications, this CM explained importance of aspirin to prevent clots which can cause stroke. Support system:  patient and spouse    Discharge Instructions :  Reviewed discharge instructions with patient. Patient verbalizes understanding of discharge instructions and follow-up care. Red Flags:  NOTIFY YOUR PHYSICIAN FOR ANY OF THE FOLLOWING:   Fever over 101 degrees for 24 hours. Chest pain, shortness of breath, fever, chills, nausea, vomiting, diarrhea, change in mentation, falling, weakness, bleeding. Severe pain or pain not relieved by medications. Or, any other signs or symptoms that you may have questions about. CALL 911 FOR ANY OF THE FOLLOWING:  · You have signs of a stroke. These may include:  ¨ Sudden numbness, tingling, weakness, or loss of movement in your face, arm, or leg, especially on only one side of your body. ¨ Sudden vision changes. ¨ Sudden trouble speaking. ¨ Sudden confusion or trouble understanding simple statements. ¨ Sudden problems with walking or balance. ¨ A sudden, severe headache that is different from past headaches.   Call 911 even if these symptoms go away in a few minutes. Advance Care Planning:   Patient was offered the opportunity to discuss advance care planning:  no     Does patient have an Advance Directive:  no   If no, did you provide information on [facility]?  no     PCP/Specialist follow up: Patient scheduled to follow up with Arnoldo Ramirez MD on 2/6. Patient states she is confused regarding what her actual diagnosis is from discharge. This CM explained that retrograde amnesia was documented, but unsure if able to rule out stroke or seizure. This CM stated that Dr. Caity Gomez recommended future testing with echo with bubble study. Pt verbalized understanding to contact Dr. Caity Gomez for appointment as recommended in 4 weeks. Patient is back at work today, continues to have headache- migraine typ with pain rated as 4-5/10. This CM recommended checking blood pressure daily, due to risk for stroke, especially if headache increases, and to log situation of headaches occurring. Pt stated that she has history of frequent migraines. Reviewed red flags with patient, and patient verbalizes understanding. Patient given an opportunity to ask questions. No other clinical/social/functional needs noted. The patient agrees to contact the PCP office for questions related to their healthcare. The patient expressed thanks, offered no additional questions and ended the call. Relaxed

## 2019-06-14 DIAGNOSIS — G43.909 MIGRAINE WITHOUT STATUS MIGRAINOSUS, NOT INTRACTABLE, UNSPECIFIED MIGRAINE TYPE: ICD-10-CM

## 2019-06-14 RX ORDER — AMITRIPTYLINE HYDROCHLORIDE 10 MG/1
TABLET, FILM COATED ORAL
Qty: 90 TAB | Refills: 0 | Status: SHIPPED | OUTPATIENT
Start: 2019-06-14 | End: 2019-09-15 | Stop reason: SDUPTHER

## 2019-06-18 DIAGNOSIS — I10 ESSENTIAL HYPERTENSION: ICD-10-CM

## 2019-06-18 RX ORDER — LOSARTAN POTASSIUM 25 MG/1
TABLET ORAL
Qty: 90 TAB | Refills: 0 | Status: SHIPPED | OUTPATIENT
Start: 2019-06-18 | End: 2019-09-15 | Stop reason: SDUPTHER

## 2019-09-15 DIAGNOSIS — I10 ESSENTIAL HYPERTENSION: ICD-10-CM

## 2019-09-15 DIAGNOSIS — G43.909 MIGRAINE WITHOUT STATUS MIGRAINOSUS, NOT INTRACTABLE, UNSPECIFIED MIGRAINE TYPE: ICD-10-CM

## 2019-09-16 RX ORDER — LOSARTAN POTASSIUM 25 MG/1
TABLET ORAL
Qty: 90 TAB | Refills: 0 | Status: SHIPPED | OUTPATIENT
Start: 2019-09-16 | End: 2019-12-26

## 2019-09-16 RX ORDER — AMITRIPTYLINE HYDROCHLORIDE 10 MG/1
TABLET, FILM COATED ORAL
Qty: 90 TAB | Refills: 0 | Status: SHIPPED | OUTPATIENT
Start: 2019-09-16 | End: 2019-12-23

## 2019-09-18 ENCOUNTER — TELEPHONE (OUTPATIENT)
Dept: INTERNAL MEDICINE CLINIC | Age: 54
End: 2019-09-18

## 2019-09-18 NOTE — TELEPHONE ENCOUNTER
Re: 9/15/19 refill -   Tried to call pt to schedule f/u appt, unable to reach pt at ph#s listed, ([phone] - Mailbox is full, [phone] has been disconnected, [phone] has been disconnected). If pt calls, please schedule follow up appt.

## 2019-12-21 DIAGNOSIS — G43.909 MIGRAINE WITHOUT STATUS MIGRAINOSUS, NOT INTRACTABLE, UNSPECIFIED MIGRAINE TYPE: ICD-10-CM

## 2019-12-23 RX ORDER — AMITRIPTYLINE HYDROCHLORIDE 10 MG/1
TABLET, FILM COATED ORAL
Qty: 90 TAB | Refills: 0 | Status: SHIPPED | OUTPATIENT
Start: 2019-12-23 | End: 2020-03-18

## 2019-12-25 DIAGNOSIS — I10 ESSENTIAL HYPERTENSION: ICD-10-CM

## 2019-12-26 RX ORDER — LOSARTAN POTASSIUM 25 MG/1
TABLET ORAL
Qty: 90 TAB | Refills: 0 | Status: SHIPPED | OUTPATIENT
Start: 2019-12-26 | End: 2020-03-20

## 2020-03-18 DIAGNOSIS — G43.909 MIGRAINE WITHOUT STATUS MIGRAINOSUS, NOT INTRACTABLE, UNSPECIFIED MIGRAINE TYPE: ICD-10-CM

## 2020-03-18 RX ORDER — AMITRIPTYLINE HYDROCHLORIDE 10 MG/1
TABLET, FILM COATED ORAL
Qty: 90 TAB | Refills: 0 | Status: SHIPPED | OUTPATIENT
Start: 2020-03-18

## 2020-03-20 DIAGNOSIS — I10 ESSENTIAL HYPERTENSION: ICD-10-CM

## 2020-03-20 RX ORDER — LOSARTAN POTASSIUM 25 MG/1
TABLET ORAL
Qty: 90 TAB | Refills: 0 | Status: SHIPPED | OUTPATIENT
Start: 2020-03-20

## 2021-07-20 NOTE — PROGRESS NOTES
HISTORY OF PRESENT ILLNESS  Lata Solis is a 50 y.o. female. HPI  Presents for f/u hospital admission    Admitted with retrograde amnesia  ddx transient global amnesia vs confusional migraine vs TIA    Off lamictal x a few years now  Last known grand mal, GTC seizure 10 years ago    Prior confusion with topamax use for migraine proph, but was on lamictal at the time as well  No other migraine proph med    Sporadic HAs and imitrex use  Works best if catches migraine early    Past medical, Social, and Family history reviewed  Medications reviewed and updated. ROS  Complete ROS reviewed and negative or stable except as noted in HPI. Physical Exam   Constitutional: She is oriented to person, place, and time. She appears well-nourished. No distress. HENT:   Head: Normocephalic and atraumatic. Mouth/Throat: Oropharynx is clear and moist. No oropharyngeal exudate. Eyes: EOM are normal. Pupils are equal, round, and reactive to light. No scleral icterus. Neck: Normal range of motion. Neck supple. No JVD present. No thyromegaly present. Cardiovascular: Normal rate, regular rhythm and normal heart sounds. Exam reveals no gallop and no friction rub. No murmur heard. Pulmonary/Chest: Effort normal and breath sounds normal. No respiratory distress. She has no wheezes. She has no rales. Abdominal: Soft. Bowel sounds are normal. She exhibits no distension. There is no tenderness. Musculoskeletal: Normal range of motion. She exhibits no edema. Lymphadenopathy:     She has no cervical adenopathy. Neurological: She is alert and oriented to person, place, and time. She exhibits normal muscle tone. Coordination normal.   Skin: Skin is warm. No rash noted. Psychiatric: She has a normal mood and affect. Nursing note and vitals reviewed. Prior labs reviewed.   Reviewed prior imaging reports  Reviewed dc summary    ASSESSMENT and PLAN  Given known hx of migraine and active HA at the time of her symptoms, favor complex migraine as source of her episode    ICD-10-CM ICD-9-CM    1. Retrograde amnesia R41.2 780.93    2. Migraine without status migrainosus, not intractable, unspecified migraine type G43.909 346.90 SUMAtriptan (IMITREX) 100 mg tablet      amitriptyline (ELAVIL) 10 mg tablet   3. TGA (transient global amnesia) G45.4 437.7    4. Hyperlipidemia, unspecified hyperlipidemia type E78.5 272.4    5. Seizure disorder ([facility] [address] G40.909 345.90      Follow-up Disposition:  Return in about 2 months (around 4/6/2018), or if symptoms worsen or fail to improve, for migraine, cholesterol.    results and schedule of future studies reviewed with patient  reviewed diet, exercise and weight    cardiovascular risk and specific lipid/LDL goals reviewed  reviewed medications and side effects in detail   Trial of elavil - low dose given pt's prior sensitivity to meds  Reviewed statin - deferred for now  Continue ASA for now  If neuro feels strongly that TIA/TGA more likely dx then need to tx lipids more aggressively No

## 2022-03-18 PROBLEM — G93.40 ACUTE ENCEPHALOPATHY: Status: ACTIVE | Noted: 2018-01-31

## 2022-03-18 PROBLEM — G45.4 TGA (TRANSIENT GLOBAL AMNESIA): Status: ACTIVE | Noted: 2018-01-31

## 2022-03-19 PROBLEM — E66.9 OBESITY (BMI 30-39.9): Status: ACTIVE | Noted: 2018-01-31

## 2022-03-19 PROBLEM — R41.2 RETROGRADE AMNESIA: Status: ACTIVE | Noted: 2018-01-31

## 2023-07-13 ENCOUNTER — OFFICE VISIT (OUTPATIENT)
Age: 58
End: 2023-07-13
Payer: COMMERCIAL

## 2023-07-13 VITALS
OXYGEN SATURATION: 99 % | WEIGHT: 159 LBS | RESPIRATION RATE: 16 BRPM | SYSTOLIC BLOOD PRESSURE: 139 MMHG | HEART RATE: 64 BPM | BODY MASS INDEX: 28.17 KG/M2 | HEIGHT: 63 IN | DIASTOLIC BLOOD PRESSURE: 85 MMHG | TEMPERATURE: 97.8 F

## 2023-07-13 DIAGNOSIS — Z23 ENCOUNTER FOR ADMINISTRATION OF VACCINE: ICD-10-CM

## 2023-07-13 DIAGNOSIS — Z11.59 NEED FOR HEPATITIS C SCREENING TEST: ICD-10-CM

## 2023-07-13 DIAGNOSIS — Z12.31 ENCOUNTER FOR SCREENING MAMMOGRAM FOR MALIGNANT NEOPLASM OF BREAST: ICD-10-CM

## 2023-07-13 DIAGNOSIS — E66.3 OVERWEIGHT (BMI 25.0-29.9): ICD-10-CM

## 2023-07-13 DIAGNOSIS — G40.909 SEIZURE DISORDER (HCC): ICD-10-CM

## 2023-07-13 DIAGNOSIS — I10 PRIMARY HYPERTENSION: Primary | ICD-10-CM

## 2023-07-13 DIAGNOSIS — Z23 NEED FOR PROPHYLACTIC VACCINATION AND INOCULATION AGAINST VARICELLA: ICD-10-CM

## 2023-07-13 DIAGNOSIS — Z12.11 SCREEN FOR COLON CANCER: ICD-10-CM

## 2023-07-13 DIAGNOSIS — Z12.4 SCREENING FOR CERVICAL CANCER: ICD-10-CM

## 2023-07-13 DIAGNOSIS — Z00.00 ROUTINE PHYSICAL EXAMINATION: ICD-10-CM

## 2023-07-13 PROBLEM — G45.4 TGA (TRANSIENT GLOBAL AMNESIA): Status: RESOLVED | Noted: 2018-01-31 | Resolved: 2023-07-13

## 2023-07-13 PROBLEM — G93.40 ACUTE ENCEPHALOPATHY: Status: RESOLVED | Noted: 2018-01-31 | Resolved: 2023-07-13

## 2023-07-13 PROBLEM — R41.2 RETROGRADE AMNESIA: Status: RESOLVED | Noted: 2018-01-31 | Resolved: 2023-07-13

## 2023-07-13 PROBLEM — E66.9 OBESITY (BMI 30-39.9): Status: RESOLVED | Noted: 2018-01-31 | Resolved: 2023-07-13

## 2023-07-13 PROCEDURE — 90750 HZV VACC RECOMBINANT IM: CPT | Performed by: STUDENT IN AN ORGANIZED HEALTH CARE EDUCATION/TRAINING PROGRAM

## 2023-07-13 PROCEDURE — 3079F DIAST BP 80-89 MM HG: CPT | Performed by: STUDENT IN AN ORGANIZED HEALTH CARE EDUCATION/TRAINING PROGRAM

## 2023-07-13 PROCEDURE — 99204 OFFICE O/P NEW MOD 45 MIN: CPT | Performed by: STUDENT IN AN ORGANIZED HEALTH CARE EDUCATION/TRAINING PROGRAM

## 2023-07-13 PROCEDURE — 90471 IMMUNIZATION ADMIN: CPT | Performed by: STUDENT IN AN ORGANIZED HEALTH CARE EDUCATION/TRAINING PROGRAM

## 2023-07-13 PROCEDURE — 3075F SYST BP GE 130 - 139MM HG: CPT | Performed by: STUDENT IN AN ORGANIZED HEALTH CARE EDUCATION/TRAINING PROGRAM

## 2023-07-13 SDOH — ECONOMIC STABILITY: INCOME INSECURITY: HOW HARD IS IT FOR YOU TO PAY FOR THE VERY BASICS LIKE FOOD, HOUSING, MEDICAL CARE, AND HEATING?: NOT HARD AT ALL

## 2023-07-13 SDOH — ECONOMIC STABILITY: FOOD INSECURITY: WITHIN THE PAST 12 MONTHS, YOU WORRIED THAT YOUR FOOD WOULD RUN OUT BEFORE YOU GOT MONEY TO BUY MORE.: NEVER TRUE

## 2023-07-13 SDOH — ECONOMIC STABILITY: FOOD INSECURITY: WITHIN THE PAST 12 MONTHS, THE FOOD YOU BOUGHT JUST DIDN'T LAST AND YOU DIDN'T HAVE MONEY TO GET MORE.: NEVER TRUE

## 2023-07-13 SDOH — ECONOMIC STABILITY: HOUSING INSECURITY
IN THE LAST 12 MONTHS, WAS THERE A TIME WHEN YOU DID NOT HAVE A STEADY PLACE TO SLEEP OR SLEPT IN A SHELTER (INCLUDING NOW)?: NO

## 2023-07-13 ASSESSMENT — PATIENT HEALTH QUESTIONNAIRE - PHQ9
SUM OF ALL RESPONSES TO PHQ QUESTIONS 1-9: 0
1. LITTLE INTEREST OR PLEASURE IN DOING THINGS: 0
SUM OF ALL RESPONSES TO PHQ9 QUESTIONS 1 & 2: 0
2. FEELING DOWN, DEPRESSED OR HOPELESS: 0

## 2023-07-13 NOTE — PROGRESS NOTES
1. \"Have you been to the ER, urgent care clinic since your last visit? Hospitalized since your last visit? \" no    2. \"Have you seen or consulted any other health care providers outside of the [address] [place] [address] since your last visit? \" no     3. For patients aged 43-73: Has the patient had a colonoscopy / FIT/ Cologuard? No      If the patient is female:    4. For patients aged 43-66: Has the patient had a mammogram within the past 2 years? no      5. For patients aged 21-65: Has the patient had a pap smear?  No

## 2023-07-13 NOTE — ASSESSMENT & PLAN NOTE
Previously required medication prior to weight loss, now however she is better controlled without medication.   Advised patient to continue to monitor at home and send the results via [address] [place] [address]

## 2023-07-13 NOTE — PROGRESS NOTES
[facility]   Internal Medicine  [address], [address] [facility], [facility] Christel Raee  [phone]       7/13/23    Primary Care Visit Note    Assessment/Plan:     1. Primary hypertension  Assessment & Plan:  Previously required medication prior to weight loss, now however she is better controlled without medication. Advised patient to continue to monitor at home and send the results via MyChart  2. Seizure disorder [facility])  Assessment & Plan:  Stable off medication for the past 6 years. Does not currently have an established neurologist.  Orders:  -     Ernie Cárdenas MD, Neurology, [place]  3. Encounter for administration of vaccine  -     Zoster, SHINGRIX, (18 yrs +), IM  4. Need for hepatitis C screening test  -     Hepatitis C Antibody; Future  5. Routine physical examination  -     Comprehensive Metabolic Panel; Future  -     CBC with Auto Differential; Future  -     Hemoglobin A1C; Future  -     Lipid Panel; Future  6. Screen for colon cancer  -     AFL - Sammie Diaz MD, Gastroenterology, [facility])  7. Screening for cervical cancer  -     AFL - Tyron Morales MD, Ob-Gyn, [place]  8. Encounter for screening mammogram for malignant neoplasm of breast  -     KAREN DIGITAL SCREEN W OR WO CAD BILATERAL; Future  9. Overweight (BMI 25.0-29.9)  -     Carin Cr MD, Bariatrics (non Surgical), [facility])  10. Need for prophylactic vaccination and inoculation against varicella  -     Zoster, SHINGRIX, (18 yrs +), IM        Follow up:  Return in about 3 months (around 10/13/2023) for office visit. Apryl Chaparro MD    CC:     Chief Complaint   Patient presents with    Hypertension    Headache    New Patient       HPI:     Jefferson Juárez is a 66 y.o. female who presents for:    Establishing care previously a patient with Dr. Indira Crawford.     She was not feeling well and had her blood pressure

## 2023-07-14 LAB
ALBUMIN SERPL-MCNC: 4 G/DL (ref 3.5–5)
ALBUMIN/GLOB SERPL: 1.3 (ref 1.1–2.2)
ALP SERPL-CCNC: 70 U/L (ref 45–117)
ALT SERPL-CCNC: 18 U/L (ref 12–78)
ANION GAP SERPL CALC-SCNC: 8 MMOL/L (ref 5–15)
AST SERPL-CCNC: 13 U/L (ref 15–37)
BASOPHILS # BLD: 0 K/UL (ref 0–0.1)
BASOPHILS NFR BLD: 1 % (ref 0–1)
BILIRUB SERPL-MCNC: 0.3 MG/DL (ref 0.2–1)
BUN SERPL-MCNC: 9 MG/DL (ref 6–20)
BUN/CREAT SERPL: 11 (ref 12–20)
CALCIUM SERPL-MCNC: 9.7 MG/DL (ref 8.5–10.1)
CHLORIDE SERPL-SCNC: 106 MMOL/L (ref 97–108)
CHOLEST SERPL-MCNC: 248 MG/DL
CO2 SERPL-SCNC: 28 MMOL/L (ref 21–32)
CREAT SERPL-MCNC: 0.84 MG/DL (ref 0.55–1.02)
DIFFERENTIAL METHOD BLD: NORMAL
EOSINOPHIL # BLD: 0.1 K/UL (ref 0–0.4)
EOSINOPHIL NFR BLD: 1 % (ref 0–7)
ERYTHROCYTE [DISTWIDTH] IN BLOOD BY AUTOMATED COUNT: 12.1 % (ref 11.5–14.5)
EST. AVERAGE GLUCOSE BLD GHB EST-MCNC: 82 MG/DL
GLOBULIN SER CALC-MCNC: 3.1 G/DL (ref 2–4)
GLUCOSE SERPL-MCNC: 89 MG/DL (ref 65–100)
HBA1C MFR BLD: 4.5 % (ref 4–5.6)
HCT VFR BLD AUTO: 39.4 % (ref 35–47)
HCV AB SERPL QL IA: NONREACTIVE
HDLC SERPL-MCNC: 65 MG/DL
HDLC SERPL: 3.8 (ref 0–5)
HGB BLD-MCNC: 13.2 G/DL (ref 11.5–16)
IMM GRANULOCYTES # BLD AUTO: 0 K/UL (ref 0–0.04)
IMM GRANULOCYTES NFR BLD AUTO: 0 % (ref 0–0.5)
LDLC SERPL CALC-MCNC: 154.2 MG/DL (ref 0–100)
LYMPHOCYTES # BLD: 2.4 K/UL (ref 0.8–3.5)
LYMPHOCYTES NFR BLD: 39 % (ref 12–49)
MCH RBC QN AUTO: 31.1 PG (ref 26–34)
MCHC RBC AUTO-ENTMCNC: 33.5 G/DL (ref 30–36.5)
MCV RBC AUTO: 92.7 FL (ref 80–99)
MONOCYTES # BLD: 0.3 K/UL (ref 0–1)
MONOCYTES NFR BLD: 5 % (ref 5–13)
NEUTS SEG # BLD: 3.3 K/UL (ref 1.8–8)
NEUTS SEG NFR BLD: 54 % (ref 32–75)
NRBC # BLD: 0 K/UL (ref 0–0.01)
NRBC BLD-RTO: 0 PER 100 WBC
PLATELET # BLD AUTO: 379 K/UL (ref 150–400)
PMV BLD AUTO: 10 FL (ref 8.9–12.9)
POTASSIUM SERPL-SCNC: 4.1 MMOL/L (ref 3.5–5.1)
PROT SERPL-MCNC: 7.1 G/DL (ref 6.4–8.2)
RBC # BLD AUTO: 4.25 M/UL (ref 3.8–5.2)
SODIUM SERPL-SCNC: 142 MMOL/L (ref 136–145)
TRIGL SERPL-MCNC: 144 MG/DL
VLDLC SERPL CALC-MCNC: 28.8 MG/DL
WBC # BLD AUTO: 6.1 K/UL (ref 3.6–11)

## 2023-07-26 ENCOUNTER — CLINICAL DOCUMENTATION (OUTPATIENT)
Age: 58
End: 2023-07-26

## 2023-08-04 NOTE — PROGRESS NOTES
Patient attended our VIRTUAL Medically Supervised Weight Loss New Patient Orientation on Wednesday July 26, 2023 where we discussed:  New Direction Very Low and the Low Calorie diet details  Medical Supervision  Nutrition education  Cost of Meal Replacements  Patient has been emailed the new patient paperwork to complete along with the copy of the power point presentation. Once patient returns the paperwork to our office, we will contact them to schedule a consultation.

## 2023-10-19 ENCOUNTER — OFFICE VISIT (OUTPATIENT)
Age: 58
End: 2023-10-19
Payer: COMMERCIAL

## 2023-10-19 VITALS
HEIGHT: 63 IN | TEMPERATURE: 97.9 F | DIASTOLIC BLOOD PRESSURE: 88 MMHG | WEIGHT: 155.2 LBS | SYSTOLIC BLOOD PRESSURE: 122 MMHG | OXYGEN SATURATION: 100 % | RESPIRATION RATE: 12 BRPM | HEART RATE: 60 BPM | BODY MASS INDEX: 27.5 KG/M2

## 2023-10-19 DIAGNOSIS — Z23 ENCOUNTER FOR ADMINISTRATION OF VACCINE: ICD-10-CM

## 2023-10-19 DIAGNOSIS — E66.3 OVERWEIGHT (BMI 25.0-29.9): Primary | ICD-10-CM

## 2023-10-19 DIAGNOSIS — I10 PRIMARY HYPERTENSION: ICD-10-CM

## 2023-10-19 PROCEDURE — 3079F DIAST BP 80-89 MM HG: CPT | Performed by: STUDENT IN AN ORGANIZED HEALTH CARE EDUCATION/TRAINING PROGRAM

## 2023-10-19 PROCEDURE — 90750 HZV VACC RECOMBINANT IM: CPT | Performed by: STUDENT IN AN ORGANIZED HEALTH CARE EDUCATION/TRAINING PROGRAM

## 2023-10-19 PROCEDURE — 3074F SYST BP LT 130 MM HG: CPT | Performed by: STUDENT IN AN ORGANIZED HEALTH CARE EDUCATION/TRAINING PROGRAM

## 2023-10-19 PROCEDURE — 99213 OFFICE O/P EST LOW 20 MIN: CPT | Performed by: STUDENT IN AN ORGANIZED HEALTH CARE EDUCATION/TRAINING PROGRAM

## 2023-10-19 PROCEDURE — 90471 IMMUNIZATION ADMIN: CPT | Performed by: STUDENT IN AN ORGANIZED HEALTH CARE EDUCATION/TRAINING PROGRAM

## 2024-02-01 ENCOUNTER — HOSPITAL ENCOUNTER (OUTPATIENT)
Facility: HOSPITAL | Age: 59
Discharge: HOME | End: 2024-02-01
Attending: STUDENT IN AN ORGANIZED HEALTH CARE EDUCATION/TRAINING PROGRAM
Payer: COMMERCIAL

## 2024-02-01 VITALS — WEIGHT: 155.2 LBS | HEIGHT: 64 IN | BODY MASS INDEX: 26.5 KG/M2

## 2024-02-01 DIAGNOSIS — Z12.31 ENCOUNTER FOR SCREENING MAMMOGRAM FOR MALIGNANT NEOPLASM OF BREAST: ICD-10-CM

## 2024-02-01 PROCEDURE — 77067 SCR MAMMO BI INCL CAD: CPT

## 2024-04-18 ENCOUNTER — OFFICE VISIT (OUTPATIENT)
Age: 59
End: 2024-04-18
Payer: COMMERCIAL

## 2024-04-18 VITALS
RESPIRATION RATE: 16 BRPM | DIASTOLIC BLOOD PRESSURE: 95 MMHG | TEMPERATURE: 97.8 F | HEIGHT: 63 IN | OXYGEN SATURATION: 97 % | WEIGHT: 161.8 LBS | HEART RATE: 62 BPM | SYSTOLIC BLOOD PRESSURE: 138 MMHG | BODY MASS INDEX: 28.67 KG/M2

## 2024-04-18 DIAGNOSIS — I10 PRIMARY HYPERTENSION: Primary | ICD-10-CM

## 2024-04-18 DIAGNOSIS — Z12.11 ENCOUNTER FOR SCREENING FOR MALIGNANT NEOPLASM OF COLON: ICD-10-CM

## 2024-04-18 DIAGNOSIS — Z12.4 ENCOUNTER FOR SCREENING FOR MALIGNANT NEOPLASM OF CERVIX: ICD-10-CM

## 2024-04-18 DIAGNOSIS — E78.00 PURE HYPERCHOLESTEROLEMIA: ICD-10-CM

## 2024-04-18 LAB
ALBUMIN SERPL-MCNC: 3.8 G/DL (ref 3.5–5)
ALBUMIN/GLOB SERPL: 1.2 (ref 1.1–2.2)
ALP SERPL-CCNC: 83 U/L (ref 45–117)
ALT SERPL-CCNC: 21 U/L (ref 12–78)
ANION GAP SERPL CALC-SCNC: 3 MMOL/L (ref 5–15)
AST SERPL-CCNC: 13 U/L (ref 15–37)
BILIRUB SERPL-MCNC: 0.3 MG/DL (ref 0.2–1)
BUN SERPL-MCNC: 8 MG/DL (ref 6–20)
BUN/CREAT SERPL: 9 (ref 12–20)
CALCIUM SERPL-MCNC: 9.5 MG/DL (ref 8.5–10.1)
CHLORIDE SERPL-SCNC: 111 MMOL/L (ref 97–108)
CHOLEST SERPL-MCNC: 231 MG/DL
CO2 SERPL-SCNC: 29 MMOL/L (ref 21–32)
CREAT SERPL-MCNC: 0.92 MG/DL (ref 0.55–1.02)
GLOBULIN SER CALC-MCNC: 3.1 G/DL (ref 2–4)
GLUCOSE SERPL-MCNC: 92 MG/DL (ref 65–100)
HDLC SERPL-MCNC: 71 MG/DL
HDLC SERPL: 3.3 (ref 0–5)
LDLC SERPL CALC-MCNC: 138.4 MG/DL (ref 0–100)
POTASSIUM SERPL-SCNC: 4.7 MMOL/L (ref 3.5–5.1)
PROT SERPL-MCNC: 6.9 G/DL (ref 6.4–8.2)
SODIUM SERPL-SCNC: 143 MMOL/L (ref 136–145)
TRIGL SERPL-MCNC: 108 MG/DL
TSH SERPL DL<=0.05 MIU/L-ACNC: 0.94 UIU/ML (ref 0.36–3.74)
VLDLC SERPL CALC-MCNC: 21.6 MG/DL

## 2024-04-18 PROCEDURE — 3080F DIAST BP >= 90 MM HG: CPT | Performed by: STUDENT IN AN ORGANIZED HEALTH CARE EDUCATION/TRAINING PROGRAM

## 2024-04-18 PROCEDURE — 99213 OFFICE O/P EST LOW 20 MIN: CPT | Performed by: STUDENT IN AN ORGANIZED HEALTH CARE EDUCATION/TRAINING PROGRAM

## 2024-04-18 PROCEDURE — 3075F SYST BP GE 130 - 139MM HG: CPT | Performed by: STUDENT IN AN ORGANIZED HEALTH CARE EDUCATION/TRAINING PROGRAM

## 2024-04-18 RX ORDER — LISINOPRIL 5 MG/1
5 TABLET ORAL DAILY
Qty: 90 TABLET | Refills: 1 | Status: SHIPPED | OUTPATIENT
Start: 2024-04-18

## 2024-04-18 ASSESSMENT — PATIENT HEALTH QUESTIONNAIRE - PHQ9
1. LITTLE INTEREST OR PLEASURE IN DOING THINGS: NOT AT ALL
2. FEELING DOWN, DEPRESSED OR HOPELESS: NOT AT ALL
SUM OF ALL RESPONSES TO PHQ QUESTIONS 1-9: 0
SUM OF ALL RESPONSES TO PHQ9 QUESTIONS 1 & 2: 0
SUM OF ALL RESPONSES TO PHQ QUESTIONS 1-9: 0

## 2024-04-18 NOTE — PROGRESS NOTES
HISTORY OF PRESENT ILLNESS   Yissel Melendez is a 58 y.o. female who  has a past medical history of Acute encephalopathy, Epilepsy (HCC), Headache, Hypertension, Obesity (BMI 30-39.9), Retrograde amnesia, and TGA (transient global amnesia).     Pt presents for 6 mo f/up.    Did get mammogram done but did not schedule colonoscopy or pap.     Overweight: given referral to Dr. Barbie Lemos, but she does not want do the food planning that they recommended. She does work with dieticians.  10/19/23: 155 lb, Discussed wt loss progress, goals, and strategies.   Today 161 lb, recently on vacation, trying to get back on track now. She has cut back on carbs. Snacks have been the biggest problem.     HTN: RECALL Previously required medication prior to weight loss, had been on lisinopril in the past w/o SE. 10/19/23 122/88.   Home readings: not checking at home.  /91    HLD:  on 7/13/23,   The 10-year ASCVD risk score (Buddy WATKINS, et al., 2019) is: 3.7%    Values used to calculate the score:      Age: 58 years      Sex: Female      Is Non- : No      Diabetic: No      Tobacco smoker: No      Systolic Blood Pressure: 138 mmHg      Is BP treated: Yes      HDL Cholesterol: 65 MG/DL      Total Cholesterol: 248 MG/DL      Seizure disorder: RECALL Stable off medication for the past 6 years.  Does not currently have an established neurologist.    HM -  - given referral for mammogram and colonoscopy  - given referral ob-gyn for pap.     SOCIAL HISTORY: Practice admin for DM    BP (!) 138/95 Comment: manual  Pulse 62   Temp 97.8 °F (36.6 °C) (Temporal)   Resp 16   Ht 1.6 m (5' 3\")   Wt 73.4 kg (161 lb 12.8 oz)   SpO2 97%   BMI 28.66 kg/m²     Physical Exam  Constitutional:       General: She is not in acute distress.     Appearance: Normal appearance. She is not toxic-appearing.   HENT:      Head: Normocephalic and atraumatic.      Mouth/Throat:      Mouth: Mucous membranes are moist.   Eyes:

## 2024-04-18 NOTE — PROGRESS NOTES
\"Have you been to the ER, urgent care clinic since your last visit?  Hospitalized since your last visit?\"    NO    “Have you seen or consulted any other health care providers outside of [facility] since your last visit?”    NO     “Have you had a pap smear?”    NO    No cervical cancer screening on file         “Have you had a colorectal cancer screening such as a colonoscopy/FIT/Cologuard?    NO    No colonoscopy on file  No cologuard on file  No FIT/FOBT on file   No flexible sigmoidoscopy on file         Click Here for Release of Records Request

## 2024-04-18 NOTE — ASSESSMENT & PLAN NOTE
Uncontrolled, changes made today: restart lisinopril at 5 mg.  Advised to start checking bp weekly, given parameters.

## 2024-04-18 NOTE — PATIENT INSTRUCTIONS
Check your blood pressure at home weekly and if the top number is consistently greater than 140 or the bottom number is consistently greater than 90, call to schedule an appointment.      Also call if top number is consistently less than 100 or the bottom number is consistently less than 60.

## 2024-07-08 LAB
CHOLEST SERPL-MCNC: 230 MG/DL
GLUCOSE SERPL-MCNC: 94 MG/DL (ref 65–100)
HDLC SERPL-MCNC: 59 MG/DL
LDLC SERPL CALC-MCNC: 148.2 MG/DL (ref 0–100)
TRIGL SERPL-MCNC: 114 MG/DL